# Patient Record
Sex: MALE | Race: WHITE | NOT HISPANIC OR LATINO | Employment: OTHER | URBAN - NONMETROPOLITAN AREA
[De-identification: names, ages, dates, MRNs, and addresses within clinical notes are randomized per-mention and may not be internally consistent; named-entity substitution may affect disease eponyms.]

---

## 2019-09-20 ENCOUNTER — TRANSCRIBE ORDERS (OUTPATIENT)
Dept: ADMINISTRATIVE | Facility: HOSPITAL | Age: 54
End: 2019-09-20

## 2019-09-20 ENCOUNTER — HOSPITAL ENCOUNTER (OUTPATIENT)
Dept: ULTRASOUND IMAGING | Facility: HOSPITAL | Age: 54
Discharge: HOME | End: 2019-09-20
Admitting: INTERNAL MEDICINE

## 2019-09-20 DIAGNOSIS — N13.9 ACUTE UNILATERAL OBSTRUCTIVE UROPATHY: ICD-10-CM

## 2019-09-20 DIAGNOSIS — N13.9 ACUTE UNILATERAL OBSTRUCTIVE UROPATHY: Primary | ICD-10-CM

## 2019-09-20 PROCEDURE — 76775 US EXAM ABDO BACK WALL LIM: CPT | Performed by: RADIOLOGY

## 2019-09-20 PROCEDURE — 76775 US EXAM ABDO BACK WALL LIM: CPT

## 2019-10-01 ENCOUNTER — OFFICE VISIT (OUTPATIENT)
Dept: UROLOGY | Facility: CLINIC | Age: 54
End: 2019-10-01

## 2019-10-01 VITALS
HEIGHT: 72 IN | BODY MASS INDEX: 26.9 KG/M2 | WEIGHT: 198.6 LBS | DIASTOLIC BLOOD PRESSURE: 80 MMHG | SYSTOLIC BLOOD PRESSURE: 138 MMHG

## 2019-10-01 DIAGNOSIS — R39.12 BENIGN PROSTATIC HYPERPLASIA WITH WEAK URINARY STREAM: ICD-10-CM

## 2019-10-01 DIAGNOSIS — R33.9 INCOMPLETE BLADDER EMPTYING: Primary | ICD-10-CM

## 2019-10-01 DIAGNOSIS — R35.0 FREQUENCY OF URINATION: ICD-10-CM

## 2019-10-01 DIAGNOSIS — N40.1 BENIGN PROSTATIC HYPERPLASIA WITH WEAK URINARY STREAM: ICD-10-CM

## 2019-10-01 LAB
BILIRUB BLD-MCNC: NEGATIVE MG/DL
CLARITY, POC: CLEAR
COLOR UR: YELLOW
GLUCOSE UR STRIP-MCNC: NEGATIVE MG/DL
KETONES UR QL: NEGATIVE
LEUKOCYTE EST, POC: NEGATIVE
NITRITE UR-MCNC: NEGATIVE MG/ML
PH UR: 6 [PH] (ref 5–8)
PROT UR STRIP-MCNC: NEGATIVE MG/DL
RBC # UR STRIP: NEGATIVE /UL
SP GR UR: 1.01 (ref 1–1.03)
UROBILINOGEN UR QL: NORMAL

## 2019-10-01 PROCEDURE — 51798 US URINE CAPACITY MEASURE: CPT | Performed by: UROLOGY

## 2019-10-01 PROCEDURE — 81003 URINALYSIS AUTO W/O SCOPE: CPT | Performed by: UROLOGY

## 2019-10-01 PROCEDURE — 99203 OFFICE O/P NEW LOW 30 MIN: CPT | Performed by: UROLOGY

## 2019-10-01 RX ORDER — CIPROFLOXACIN 500 MG/1
TABLET, FILM COATED ORAL
COMMUNITY
Start: 2019-09-27 | End: 2020-10-21

## 2019-10-01 RX ORDER — NIFEDIPINE 30 MG/1
30 TABLET, EXTENDED RELEASE ORAL 2 TIMES DAILY
COMMUNITY
Start: 2019-09-30

## 2019-10-01 RX ORDER — TAMSULOSIN HYDROCHLORIDE 0.4 MG/1
CAPSULE ORAL
COMMUNITY
Start: 2019-09-19 | End: 2020-10-21

## 2019-10-01 NOTE — PROGRESS NOTES
Chief Complaint:          Urgency and frequency    HPI:   54 y.o. male.  Pt had a week history of urgency and frequency.  Pt had some flank tenderness right worse than left.  He had a low grade temperature of 100 for one day.  His UA is negative today.  His symptoms are better but he has a sensation of pressure.  6 months ago he had a weak stream.  He had no other voiding symptoms.   HPI      Past Medical History:      History reviewed. No pertinent past medical history.      Current Meds:     Current Outpatient Medications   Medication Sig Dispense Refill   • ciprofloxacin (CIPRO) 500 MG tablet      • NIFEdipine XL (PROCARDIA XL) 30 MG 24 hr tablet      • tamsulosin (FLOMAX) 0.4 MG capsule 24 hr capsule        No current facility-administered medications for this visit.         Allergies:      Allergies   Allergen Reactions   • Benicar [Olmesartan] GI Intolerance        Past Surgical History:     Past Surgical History:   Procedure Laterality Date   • TONSILLECTOMY           Social History:     Social History     Socioeconomic History   • Marital status:      Spouse name: Not on file   • Number of children: Not on file   • Years of education: Not on file   • Highest education level: Not on file   Tobacco Use   • Smoking status: Never Smoker   • Smokeless tobacco: Never Used   Substance and Sexual Activity   • Alcohol use: Yes     Comment: Rarely   • Drug use: No   • Sexual activity: Not Currently       Family History:     Family History   Problem Relation Age of Onset   • Hypertension Mother        Review of Systems:     Review of Systems   Constitutional: Negative for activity change, appetite change, chills, diaphoresis and fatigue.   HENT: Negative for congestion, dental problem, drooling, ear discharge, ear pain, facial swelling and hearing loss.    Eyes: Negative for pain, discharge and itching.   Respiratory: Negative for apnea, cough, choking and chest tightness.    Cardiovascular: Negative for chest  pain, palpitations and leg swelling.   Gastrointestinal: Negative for abdominal distention, abdominal pain, anal bleeding, blood in stool and constipation.   Endocrine: Negative for cold intolerance, heat intolerance and polydipsia.   Genitourinary: Positive for frequency and urgency. Negative for difficulty urinating, discharge, dysuria, enuresis, flank pain, genital sores and hematuria.   Musculoskeletal: Negative for arthralgias, back pain, gait problem, joint swelling and myalgias.   Skin: Negative for color change and pallor.   Allergic/Immunologic: Negative for environmental allergies and food allergies.   Neurological: Negative for facial asymmetry, light-headedness, numbness and headaches.   Psychiatric/Behavioral: Negative for agitation, behavioral problems, confusion, decreased concentration, dysphoric mood and hallucinations. The patient is not hyperactive.        IPSS Questionnaire (AUA-7):  Over the past month…    1)  Incomplete Emptying  How often have you had a sensation of not emptying your bladder?  3 - About half the time   2)  Frequency  How often have you had to urinate less than every two hours? 2 - Less than half the time   3)  Intermittency  How often have you found you stopped and started again several times when you urinated?  3 - About half the time   4) Urgency  How often have you found it difficult to postpone urination?  0 - Not at all   5) Weak Stream  How often have you had a weak urinary stream?  5 - Almost always   6) Straining  How often have you had to push or strain to begin urination?  1 - Less than 1 time in 5   7) Nocturia  How many times did you typically get up at night to urinate?  2 - 2 times   Total Score:  16       Quality of life due to urinary symptoms:  If you were to spend the rest of your life with your urinary condition the way it is now, how would you feel about that? 5-Unhappy   Urine Leakage (Incontinence) 0-No Leakage       I have reviewed the follow portions of  "the patient's history and confirmed they are accurate today:  allergies, current medications, past family history, past medical history, past social history, past surgical history, problem list and ROS  Physical Exam:     Physical Exam   Constitutional: He is oriented to person, place, and time.   HENT:   Head: Normocephalic and atraumatic.   Right Ear: External ear normal.   Left Ear: External ear normal.   Nose: Nose normal.   Mouth/Throat: Oropharynx is clear and moist.   Eyes: Conjunctivae and EOM are normal. Pupils are equal, round, and reactive to light.   Neck: Normal range of motion. Neck supple. No thyromegaly present.   Cardiovascular: Normal rate, regular rhythm, normal heart sounds and intact distal pulses.   No murmur heard.  Pulmonary/Chest: Effort normal and breath sounds normal. No respiratory distress. He has no wheezes. He has no rales. He exhibits no tenderness.   Abdominal: Soft. Bowel sounds are normal. He exhibits no distension and no mass. There is no tenderness. No hernia.   Genitourinary: Rectum normal, prostate normal and penis normal.   Genitourinary Comments: No nodules   Musculoskeletal: Normal range of motion. He exhibits no edema or tenderness.   Lymphadenopathy:     He has no cervical adenopathy.   Neurological: He is alert and oriented to person, place, and time. No cranial nerve deficit. He exhibits normal muscle tone. Coordination normal.   Skin: Skin is warm. No rash noted.   Psychiatric: He has a normal mood and affect. His behavior is normal. Judgment and thought content normal.   Nursing note and vitals reviewed.      /80   Ht 182.9 cm (72\")   Wt 90.1 kg (198 lb 9.6 oz)   BMI 26.94 kg/m²    Procedure:         Assessment:     Encounter Diagnoses   Name Primary?   • Incomplete bladder emptying Yes   • Frequency of urination        Orders Placed This Encounter   Procedures   • Bladder Scan   • POC Urinalysis Dipstick, Automated       Patient reports that he is not " currently experiencing any symptoms of urinary incontinence.      Plan:   Will see pt back in a year with psa    Patient's Body mass index is 26.94 kg/m². BMI is within normal parameters. No follow-up required..      Smoking Cessation Counseling:  Never a smoker.  Patient does not currently use any tobacco products.       Counseling was given to patient for the following topics impressions as follows: prostatitis. The interim medical history and current results were reviewed.  A treatment plan with follow-up was made for Incomplete bladder emptying [R33.9].     This document has been electronically signed by Hugo Dhaliwal MD October 1, 2019 11:49 AM

## 2020-09-03 ENCOUNTER — LAB (OUTPATIENT)
Dept: LAB | Facility: HOSPITAL | Age: 55
End: 2020-09-03

## 2020-09-03 ENCOUNTER — TRANSCRIBE ORDERS (OUTPATIENT)
Dept: ADMINISTRATIVE | Facility: HOSPITAL | Age: 55
End: 2020-09-03

## 2020-09-03 DIAGNOSIS — Z20.828 EXPOSURE TO SARS-ASSOCIATED CORONAVIRUS: ICD-10-CM

## 2020-09-03 DIAGNOSIS — Z20.828 EXPOSURE TO SARS-ASSOCIATED CORONAVIRUS: Primary | ICD-10-CM

## 2020-09-03 PROCEDURE — U0004 COV-19 TEST NON-CDC HGH THRU: HCPCS

## 2020-09-03 PROCEDURE — C9803 HOPD COVID-19 SPEC COLLECT: HCPCS

## 2020-09-04 LAB — SARS-COV-2 RNA NOSE QL NAA+PROBE: NOT DETECTED

## 2020-09-21 ENCOUNTER — TRANSCRIBE ORDERS (OUTPATIENT)
Dept: ADMINISTRATIVE | Facility: HOSPITAL | Age: 55
End: 2020-09-21

## 2020-09-21 DIAGNOSIS — N45.1 EPIDIDYMITIS WITHOUT ABSCESS: Primary | ICD-10-CM

## 2020-09-23 ENCOUNTER — HOSPITAL ENCOUNTER (OUTPATIENT)
Dept: ULTRASOUND IMAGING | Facility: HOSPITAL | Age: 55
Discharge: HOME | End: 2020-09-23
Admitting: INTERNAL MEDICINE

## 2020-09-23 ENCOUNTER — HOSPITAL ENCOUNTER (OUTPATIENT)
Dept: ULTRASOUND IMAGING | Facility: HOSPITAL | Age: 55
End: 2020-09-23

## 2020-09-23 DIAGNOSIS — N45.1 EPIDIDYMITIS WITHOUT ABSCESS: ICD-10-CM

## 2020-09-23 PROCEDURE — 76870 US EXAM SCROTUM: CPT | Performed by: RADIOLOGY

## 2020-09-23 PROCEDURE — 76870 US EXAM SCROTUM: CPT

## 2020-10-06 ENCOUNTER — OFFICE VISIT (OUTPATIENT)
Dept: UROLOGY | Facility: CLINIC | Age: 55
End: 2020-10-06

## 2020-10-06 VITALS — TEMPERATURE: 98.5 F | HEIGHT: 72 IN | BODY MASS INDEX: 25.73 KG/M2 | WEIGHT: 190 LBS

## 2020-10-06 DIAGNOSIS — N40.1 BENIGN PROSTATIC HYPERPLASIA WITH WEAK URINARY STREAM: Primary | ICD-10-CM

## 2020-10-06 DIAGNOSIS — K40.90 RIGHT INGUINAL HERNIA: ICD-10-CM

## 2020-10-06 DIAGNOSIS — R39.12 BENIGN PROSTATIC HYPERPLASIA WITH WEAK URINARY STREAM: Primary | ICD-10-CM

## 2020-10-06 PROCEDURE — 99214 OFFICE O/P EST MOD 30 MIN: CPT | Performed by: UROLOGY

## 2020-10-06 RX ORDER — TADALAFIL 20 MG/1
40 TABLET ORAL
COMMUNITY
End: 2020-10-21

## 2020-10-06 RX ORDER — TADALAFIL 5 MG/1
5 TABLET ORAL DAILY PRN
Qty: 30 TABLET | Refills: 6 | Status: SHIPPED | OUTPATIENT
Start: 2020-10-06

## 2020-10-06 NOTE — PROGRESS NOTES
Chief Complaint:          Chief Complaint   Patient presents with   • Urinary Urgency       HPI:   55 y.o. male is here for evaluation.  He saw Dr. Dhaliwal last year and wants a second opinion.  He has a history of suprapubic pain dysuria right groin pain no trauma no infection he does a lot of heavy lifting as he is building his own house.  He gets up twice at night no dysuria he does have intermittency his erections and ejaculations are fine occasional rectal pain worse with sitting.  Occasional sciatica on the left he is retired .  He has mild hypertension.  He has had a tonsillectomy.  Examination shows a right inguinal hernia.  He is motivated enough to have a repair not going to make a referral other than that his examination is unremarkable.      Past Medical History:        Past Medical History:   Diagnosis Date   • Hypertension          Current Meds:     Current Outpatient Medications   Medication Sig Dispense Refill   • NIFEdipine XL (PROCARDIA XL) 30 MG 24 hr tablet      • tadalafil (ADCIRCA) 20 MG tablet tablet Take 40 mg by mouth Daily.     • ciprofloxacin (CIPRO) 500 MG tablet      • tamsulosin (FLOMAX) 0.4 MG capsule 24 hr capsule        No current facility-administered medications for this visit.         Allergies:      Allergies   Allergen Reactions   • Benicar [Olmesartan] GI Intolerance        Past Surgical History:     Past Surgical History:   Procedure Laterality Date   • TONSILLECTOMY           Social History:     Social History     Socioeconomic History   • Marital status:      Spouse name: Not on file   • Number of children: Not on file   • Years of education: Not on file   • Highest education level: Not on file   Tobacco Use   • Smoking status: Never Smoker   • Smokeless tobacco: Never Used   Substance and Sexual Activity   • Alcohol use: Never     Frequency: Never     Comment: Rarely   • Drug use: No   • Sexual activity: Not Currently       Family History:          Family History   Problem Relation Age of Onset   • Hypertension Mother    • Diabetes Father    • Hypertension Father        Review of Systems:     Review of Systems   Constitutional: Negative.  Negative for chills, fatigue and fever.   HENT: Negative.    Eyes: Negative.    Respiratory: Negative.  Negative for cough, shortness of breath and wheezing.    Cardiovascular: Negative.  Negative for leg swelling.   Gastrointestinal: Negative.  Negative for abdominal pain, nausea and vomiting.   Endocrine: Negative.    Genitourinary: Positive for urgency.   Musculoskeletal: Negative.  Negative for back pain and joint swelling.   Allergic/Immunologic: Negative.    Neurological: Negative.  Negative for dizziness and headaches.   Hematological: Negative.    Psychiatric/Behavioral: Negative.  Negative for confusion.       Physical Exam:     Physical Exam  Vitals signs and nursing note reviewed.   Constitutional:       Appearance: He is well-developed.   HENT:      Head: Normocephalic and atraumatic.   Eyes:      Conjunctiva/sclera: Conjunctivae normal.      Pupils: Pupils are equal, round, and reactive to light.   Neck:      Musculoskeletal: Normal range of motion.   Cardiovascular:      Rate and Rhythm: Normal rate and regular rhythm.      Heart sounds: Normal heart sounds.   Pulmonary:      Effort: Pulmonary effort is normal.      Breath sounds: Normal breath sounds.   Abdominal:      General: Bowel sounds are normal.      Palpations: Abdomen is soft.   Genitourinary:     Comments: Soft nontender abdomen with no organomegaly, rigidity, or tenderness.  He has normal external genitalia and uncircumcised phallus with a freely movable foreskin bilaterally descended testes without masses there is a right inguinal hernias adenopathy or abnormalities he had good rectal tone and a large smooth firm prostate.  There is no nodularity or any suspicious rectal abnormalities    Musculoskeletal: Normal range of motion.   Skin:      General: Skin is warm and dry.   Neurological:      Mental Status: He is alert and oriented to person, place, and time.      Deep Tendon Reflexes: Reflexes are normal and symmetric.   Psychiatric:         Behavior: Behavior normal.         Thought Content: Thought content normal.         Judgment: Judgment normal.         I have reviewed the following portions of the patient's history: allergies, current medications, past family history, past medical history, past social history, past surgical history, problem list and ROS and confirm it's accurate.      Procedure:       Assessment/Plan:   BPH: Discussed the pathophysiology of BPH and obstruction.  We discussed the static and dynamic effect effects of BPH as well as using 5 alpha reductase inhibitors versus alpha blockade.  We discussed the indications for transurethral surgery as well.  And/ or other therapeutic options available including all of the newer techniques.  He uses 2.5 mg of tadalafil daily and I recommended 5 mg and I gave him a new prescription  Right inguinal hernia-indirect we will make a referral he is interested in having it repaired            Patient's Body mass index is 25.77 kg/m². BMI is above normal parameters. Recommendations include: educational material.              This document has been electronically signed by ADA GARCIA MD October 6, 2020 09:55 EDT

## 2020-10-06 NOTE — PROGRESS NOTES
Chief Complaint:          No chief complaint on file.      HPI:   55 y.o. male.      Past Medical History:      No past medical history on file.      Current Meds:     Current Outpatient Medications   Medication Sig Dispense Refill   • ciprofloxacin (CIPRO) 500 MG tablet      • NIFEdipine XL (PROCARDIA XL) 30 MG 24 hr tablet      • tamsulosin (FLOMAX) 0.4 MG capsule 24 hr capsule        No current facility-administered medications for this visit.         Allergies:      Allergies   Allergen Reactions   • Benicar [Olmesartan] GI Intolerance        Past Surgical History:     Past Surgical History:   Procedure Laterality Date   • TONSILLECTOMY           Social History:     Social History     Socioeconomic History   • Marital status:      Spouse name: Not on file   • Number of children: Not on file   • Years of education: Not on file   • Highest education level: Not on file   Tobacco Use   • Smoking status: Never Smoker   • Smokeless tobacco: Never Used   Substance and Sexual Activity   • Alcohol use: Yes     Comment: Rarely   • Drug use: No   • Sexual activity: Not Currently       Family History:     Family History   Problem Relation Age of Onset   • Hypertension Mother        Review of Systems:     Review of Systems    Physical Exam:     Physical Exam    ***    Procedure:       Assessment/Plan:   ***       Patient reports that he is not currently experiencing any symptoms of urinary incontinence.      Patient's There is no height or weight on file to calculate BMI. BMI is {BMI range:05543}.              This document has been electronically signed by ADA GARCIA MD October 6, 2020 09:40 EDT

## 2020-10-07 PROBLEM — R39.12 BENIGN PROSTATIC HYPERPLASIA WITH WEAK URINARY STREAM: Status: ACTIVE | Noted: 2020-10-07

## 2020-10-07 PROBLEM — K40.90 RIGHT INGUINAL HERNIA: Status: ACTIVE | Noted: 2020-10-07

## 2020-10-07 PROBLEM — N40.1 BENIGN PROSTATIC HYPERPLASIA WITH WEAK URINARY STREAM: Status: ACTIVE | Noted: 2020-10-07

## 2020-10-21 ENCOUNTER — OFFICE VISIT (OUTPATIENT)
Dept: SURGERY | Facility: CLINIC | Age: 55
End: 2020-10-21

## 2020-10-21 VITALS
WEIGHT: 198 LBS | HEART RATE: 71 BPM | DIASTOLIC BLOOD PRESSURE: 86 MMHG | HEIGHT: 72 IN | SYSTOLIC BLOOD PRESSURE: 148 MMHG | BODY MASS INDEX: 26.82 KG/M2

## 2020-10-21 DIAGNOSIS — K40.20 NON-RECURRENT BILATERAL INGUINAL HERNIA WITHOUT OBSTRUCTION OR GANGRENE: Primary | ICD-10-CM

## 2020-10-21 PROCEDURE — 99243 OFF/OP CNSLTJ NEW/EST LOW 30: CPT | Performed by: SURGERY

## 2020-10-22 DIAGNOSIS — Z01.818 PREOP TESTING: Primary | ICD-10-CM

## 2020-10-22 PROBLEM — K40.20 NON-RECURRENT BILATERAL INGUINAL HERNIA WITHOUT OBSTRUCTION OR GANGRENE: Status: ACTIVE | Noted: 2020-10-22

## 2020-10-22 NOTE — PROGRESS NOTES
Subjective   Shyam Salas is a 55 y.o. male is being seen for consultation today at the request of Claudine Bonilla MD    Shyam Salas is a 55 y.o. male Presenting with occasionally painful right bulge in the groin consistent with inguinal hernia on examination.  Possible weakness of the ring with inguinal hernia on the contralateral side.  The patient also has a small reducible umbilical hernia.  No previous abdominal surgery.  No obstructive symptoms reported.      Past Medical History:   Diagnosis Date   • Hypertension        Family History   Problem Relation Age of Onset   • Hypertension Mother    • Diabetes Father    • Hypertension Father        Social History     Socioeconomic History   • Marital status:      Spouse name: Not on file   • Number of children: Not on file   • Years of education: Not on file   • Highest education level: Not on file   Tobacco Use   • Smoking status: Never Smoker   • Smokeless tobacco: Never Used   Substance and Sexual Activity   • Alcohol use: Never     Frequency: Never     Comment: Rarely   • Drug use: No   • Sexual activity: Not Currently       Past Surgical History:   Procedure Laterality Date   • TONSILLECTOMY         Review of Systems   Constitutional: Negative for activity change, appetite change, chills and fever.   HENT: Negative for sore throat and trouble swallowing.    Eyes: Negative for visual disturbance.   Respiratory: Negative for cough and shortness of breath.    Cardiovascular: Negative for chest pain and palpitations.   Gastrointestinal: Positive for abdominal pain. Negative for abdominal distention, blood in stool, constipation, diarrhea, nausea and vomiting.   Endocrine: Negative for cold intolerance and heat intolerance.   Genitourinary: Negative for dysuria.   Musculoskeletal: Negative for joint swelling.   Skin: Negative for color change, rash and wound.   Allergic/Immunologic: Negative for immunocompromised state.   Neurological: Negative for dizziness,  "seizures, weakness and headaches.   Hematological: Negative for adenopathy. Does not bruise/bleed easily.   Psychiatric/Behavioral: Negative for agitation and confusion.         /86   Pulse 71   Ht 182.9 cm (72.01\")   Wt 89.8 kg (198 lb)   BMI 26.85 kg/m²   Objective   Physical Exam  Constitutional:       Appearance: He is well-developed.   HENT:      Head: Normocephalic and atraumatic.   Eyes:      Conjunctiva/sclera: Conjunctivae normal.      Pupils: Pupils are equal, round, and reactive to light.   Neck:      Musculoskeletal: Neck supple.      Thyroid: No thyromegaly.      Vascular: No JVD.      Trachea: No tracheal deviation.   Cardiovascular:      Rate and Rhythm: Normal rate and regular rhythm.      Heart sounds: No murmur. No friction rub. No gallop.    Pulmonary:      Effort: Pulmonary effort is normal.      Breath sounds: Normal breath sounds.   Abdominal:      General: There is no distension.      Palpations: Abdomen is soft. There is no hepatomegaly or splenomegaly.      Tenderness: There is no abdominal tenderness.      Hernia: A hernia is present. Hernia is present in the umbilical area and right inguinal area.      Comments: Weakness of the left inguinal ring and possible hernia   Musculoskeletal: Normal range of motion.         General: No deformity.   Skin:     General: Skin is warm and dry.   Neurological:      Mental Status: He is alert and oriented to person, place, and time.               Assessment   Diagnoses and all orders for this visit:    1. Non-recurrent bilateral inguinal hernia without obstruction or gangrene (Primary)  -     Case Request; Standing  -     ceFAZolin (ANCEF) 1 g/100 mL 0.9% NS IVPB (mbp)  -     Case Request    Other orders  -     Follow anesthesia standing orders.  -     Provide NPO Instructions to Patient; Future  -     Chlorhexidine Skin Prep; Future  -     Follow Anesthesia Guidelines / Protocol; Standing  -     Verify NPO Status; Standing  -     Obtain " informed consent; Standing  -     SCD (sequential compression device)- to be placed on patient in Pre-op; Standing  -     Verify / Perform Chlorhexidine Skin Prep; Standing  -     Verify / Perform Chlorhexidine Skin Prep if Indicated (If Not Already Completed); Standing  -     Instructions on coughing, deep breathing, and incentive spirometry.; Standing      Shyam Salas is a 55 y.o. male with small reducible umbilical and symptomatic right inguinal hernia possible left inguinal hernia defect.  He understands risk and benefits of surgery will undergo robotic repair of umbilical and right possible bilateral inguinal hernia.    Patient's Body mass index is 26.85 kg/m². BMI is above normal parameters. Recommendations include: educational material.

## 2020-12-01 NOTE — DISCHARGE INSTRUCTIONS
TAKE the following medications the morning of surgery:    All heart or blood pressure medications    Please discontinue all blood thinners and anticoagulants (except aspirin) prior to surgery as per your surgeon and cardiologist instructions.  Aspirin may be continued up to the day prior to surgery.    HOLD all diabetic medications the morning of surgery as order by physician.    Please follow instructions on use of prep cloths provided by nurse. Return instruction sheet to pre-op nurse on day of surgery.    Arrival time for surgery on 12/4/20 will be given to you by Dr. Diana's office.    A RESPONSIBLE PERSON MUST REMAIN IN THE WAITING ROOM DURING YOUR PROCEDURE AND A RESPONSIBLE  MUST BE AVAILABLE UPON YOUR DISCHARGE.    General Instructions:  • Do NOT eat or drink after midnight 12/3/20 which includes water, mints, or gum.  • You may brush your teeth. Dental appliances that are removable must be taken out day of surgery.  • Do NOT smoke, chew tobacco, or drink alcohol within 24 hours prior to surgery.  • Bring medications in original bottles, any inhalers and if applicable your C-PAP/BI-PAP machine  • Bring any papers given to you in the doctor’s office  • Wear clean, comfortable clothes and socks  • Do NOT wear contact lenses or make-up or dark nail polish.  Bring a case for your glasses if applicable.  • Bring crutches or walker if applicable  • Leave all other valuables and jewelry at home  • If you were given a blood bank armband, continue to wear it until discharged.    Preventing a Surgical Site Infection:  • Shower the night before surgery (unless instructed otherwise) using a fresh bar of anti-bacterial soap (such as Dial) and clean washcloth.  Dry with a clean towel and dress in clean clothing.  • For 2 to 3 days before surgery, avoid shaving with a razor near where you will have surgery because the razor can irritate skin and make it easier to develop an infection.  Ask your surgeon if you will be  receiving antibiotics prior to surgery.  • Make sure you, your family, and all healthcare providers clean their hands with soap and water or an alcohol-based hand  before caring for you or your wound.  • If at all possible, quit smoking as many days before surgery as you can.    Day of Surgery:  Upon arrival, a pre-op nurse and anesthesiologist will review your health history, obtain vital signs, and answer questions you may have.  The only belongings needed at this time will be your home medications and if applicable you C-PAP/BI-PAP machine.  If you are staying overnight, your family can leave the rest of your belongings in the car and bring them to your room later.  A pre-op nurse will start an IV and you may receive medication in preparation for surgery.  Due to patient privacy and limited space, only one member of your family will be able to accompany you in the pre-op area.  While you are in surgery your family should notify the waiting room  if they leave the waiting room area and provide a contact number.  Please be aware that surgery does come with discomfort.  We want to make every effort to control your discomfort so please discuss any uncontrolled symptoms with your nurse.  Your doctor will most likely have prescribed pain medications.  If you are going home after surgery you will receive individualized written care instructions before being discharged.  A responsible adult must drive you to and from the hospital on the day of surgery and stay with you for 24 hours.  If you are staying overnight following surgery, you will be transported to your hospital room following the recovery period.

## 2020-12-02 ENCOUNTER — LAB (OUTPATIENT)
Dept: LAB | Facility: HOSPITAL | Age: 55
End: 2020-12-02

## 2020-12-02 ENCOUNTER — APPOINTMENT (OUTPATIENT)
Dept: PREADMISSION TESTING | Facility: HOSPITAL | Age: 55
End: 2020-12-02

## 2020-12-02 DIAGNOSIS — Z01.818 PREOP TESTING: ICD-10-CM

## 2020-12-02 LAB
ANION GAP SERPL CALCULATED.3IONS-SCNC: 10.3 MMOL/L (ref 5–15)
BUN SERPL-MCNC: 19 MG/DL (ref 6–20)
BUN/CREAT SERPL: 16.8 (ref 7–25)
CALCIUM SPEC-SCNC: 9.6 MG/DL (ref 8.6–10.5)
CHLORIDE SERPL-SCNC: 103 MMOL/L (ref 98–107)
CO2 SERPL-SCNC: 27.7 MMOL/L (ref 22–29)
CREAT SERPL-MCNC: 1.13 MG/DL (ref 0.76–1.27)
DEPRECATED RDW RBC AUTO: 46.1 FL (ref 37–54)
ERYTHROCYTE [DISTWIDTH] IN BLOOD BY AUTOMATED COUNT: 13.8 % (ref 12.3–15.4)
GFR SERPL CREATININE-BSD FRML MDRD: 67 ML/MIN/1.73
GLUCOSE SERPL-MCNC: 97 MG/DL (ref 65–99)
HCT VFR BLD AUTO: 49.5 % (ref 37.5–51)
HGB BLD-MCNC: 16.2 G/DL (ref 13–17.7)
MCH RBC QN AUTO: 29.6 PG (ref 26.6–33)
MCHC RBC AUTO-ENTMCNC: 32.7 G/DL (ref 31.5–35.7)
MCV RBC AUTO: 90.3 FL (ref 79–97)
PLATELET # BLD AUTO: 240 10*3/MM3 (ref 140–450)
PMV BLD AUTO: 11.4 FL (ref 6–12)
POTASSIUM SERPL-SCNC: 4 MMOL/L (ref 3.5–5.2)
RBC # BLD AUTO: 5.48 10*6/MM3 (ref 4.14–5.8)
SARS-COV-2 RNA RESP QL NAA+PROBE: NOT DETECTED
SODIUM SERPL-SCNC: 141 MMOL/L (ref 136–145)
WBC # BLD AUTO: 8.58 10*3/MM3 (ref 3.4–10.8)

## 2020-12-02 PROCEDURE — U0004 COV-19 TEST NON-CDC HGH THRU: HCPCS | Performed by: SURGERY

## 2020-12-02 PROCEDURE — 36415 COLL VENOUS BLD VENIPUNCTURE: CPT

## 2020-12-02 PROCEDURE — C9803 HOPD COVID-19 SPEC COLLECT: HCPCS

## 2020-12-02 PROCEDURE — 80048 BASIC METABOLIC PNL TOTAL CA: CPT

## 2020-12-02 PROCEDURE — 85027 COMPLETE CBC AUTOMATED: CPT

## 2020-12-02 RX ORDER — PRAVASTATIN SODIUM 20 MG
20 TABLET ORAL DAILY
COMMUNITY
End: 2020-12-22 | Stop reason: DRUGHIGH

## 2020-12-04 ENCOUNTER — HOSPITAL ENCOUNTER (OUTPATIENT)
Facility: HOSPITAL | Age: 55
Setting detail: HOSPITAL OUTPATIENT SURGERY
Discharge: HOME | End: 2020-12-04
Attending: SURGERY | Admitting: SURGERY

## 2020-12-04 ENCOUNTER — ANESTHESIA EVENT (OUTPATIENT)
Dept: PERIOP | Facility: HOSPITAL | Age: 55
End: 2020-12-04

## 2020-12-04 ENCOUNTER — ANESTHESIA (OUTPATIENT)
Dept: PERIOP | Facility: HOSPITAL | Age: 55
End: 2020-12-04

## 2020-12-04 VITALS
SYSTOLIC BLOOD PRESSURE: 145 MMHG | RESPIRATION RATE: 18 BRPM | HEIGHT: 72 IN | TEMPERATURE: 97.8 F | WEIGHT: 197.6 LBS | BODY MASS INDEX: 26.76 KG/M2 | HEART RATE: 80 BPM | DIASTOLIC BLOOD PRESSURE: 83 MMHG | OXYGEN SATURATION: 98 %

## 2020-12-04 DIAGNOSIS — K40.20 NON-RECURRENT BILATERAL INGUINAL HERNIA WITHOUT OBSTRUCTION OR GANGRENE: ICD-10-CM

## 2020-12-04 PROCEDURE — C1781 MESH (IMPLANTABLE): HCPCS | Performed by: SURGERY

## 2020-12-04 PROCEDURE — 25010000002 PROPOFOL 10 MG/ML EMULSION: Performed by: NURSE ANESTHETIST, CERTIFIED REGISTERED

## 2020-12-04 PROCEDURE — 49650 LAP ING HERNIA REPAIR INIT: CPT | Performed by: SURGERY

## 2020-12-04 PROCEDURE — S0260 H&P FOR SURGERY: HCPCS | Performed by: SURGERY

## 2020-12-04 PROCEDURE — C1729 CATH, DRAINAGE: HCPCS | Performed by: SURGERY

## 2020-12-04 PROCEDURE — 25010000002 MIDAZOLAM PER 1 MG: Performed by: NURSE ANESTHETIST, CERTIFIED REGISTERED

## 2020-12-04 PROCEDURE — 25010000002 NEOSTIGMINE 10 MG/10ML SOLUTION: Performed by: NURSE ANESTHETIST, CERTIFIED REGISTERED

## 2020-12-04 PROCEDURE — 25010000002 ROPIVACAINE PER 1 MG: Performed by: NURSE ANESTHETIST, CERTIFIED REGISTERED

## 2020-12-04 PROCEDURE — 25010000002 FENTANYL CITRATE (PF) 100 MCG/2ML SOLUTION: Performed by: NURSE ANESTHETIST, CERTIFIED REGISTERED

## 2020-12-04 PROCEDURE — 94799 UNLISTED PULMONARY SVC/PX: CPT

## 2020-12-04 PROCEDURE — 25010000002 ONDANSETRON PER 1 MG: Performed by: NURSE ANESTHETIST, CERTIFIED REGISTERED

## 2020-12-04 PROCEDURE — 25010000002 BUPRENORPHINE PER 0.1 MG: Performed by: NURSE ANESTHETIST, CERTIFIED REGISTERED

## 2020-12-04 PROCEDURE — 25010000002 DEXAMETHASONE PER 1 MG: Performed by: NURSE ANESTHETIST, CERTIFIED REGISTERED

## 2020-12-04 DEVICE — BARD 3DMAX MESH LEFT LARGE
Type: IMPLANTABLE DEVICE | Site: INGUINAL | Status: FUNCTIONAL
Brand: BARD 3DMAX MESH

## 2020-12-04 DEVICE — ABSORBABLE WOUND CLOSURE DEVICE
Type: IMPLANTABLE DEVICE | Site: INGUINAL | Status: FUNCTIONAL
Brand: V-LOC 90

## 2020-12-04 DEVICE — BARD 3DMAX MESH LEFT EXTRA LARGE
Type: IMPLANTABLE DEVICE | Site: INGUINAL | Status: FUNCTIONAL
Brand: BARD 3DMAX MESH

## 2020-12-04 RX ORDER — ROPIVACAINE HYDROCHLORIDE 5 MG/ML
INJECTION, SOLUTION EPIDURAL; INFILTRATION; PERINEURAL
Status: COMPLETED | OUTPATIENT
Start: 2020-12-04 | End: 2020-12-04

## 2020-12-04 RX ORDER — SODIUM CHLORIDE 0.9 % (FLUSH) 0.9 %
10 SYRINGE (ML) INJECTION AS NEEDED
Status: DISCONTINUED | OUTPATIENT
Start: 2020-12-04 | End: 2020-12-04 | Stop reason: HOSPADM

## 2020-12-04 RX ORDER — BUPRENORPHINE HYDROCHLORIDE 0.32 MG/ML
INJECTION INTRAMUSCULAR; INTRAVENOUS
Status: COMPLETED | OUTPATIENT
Start: 2020-12-04 | End: 2020-12-04

## 2020-12-04 RX ORDER — ONDANSETRON 2 MG/ML
4 INJECTION INTRAMUSCULAR; INTRAVENOUS AS NEEDED
Status: DISCONTINUED | OUTPATIENT
Start: 2020-12-04 | End: 2020-12-04 | Stop reason: HOSPADM

## 2020-12-04 RX ORDER — DEXAMETHASONE SODIUM PHOSPHATE 4 MG/ML
INJECTION, SOLUTION INTRA-ARTICULAR; INTRALESIONAL; INTRAMUSCULAR; INTRAVENOUS; SOFT TISSUE
Status: COMPLETED | OUTPATIENT
Start: 2020-12-04 | End: 2020-12-04

## 2020-12-04 RX ORDER — NEOSTIGMINE METHYLSULFATE 1 MG/ML
INJECTION, SOLUTION INTRAVENOUS AS NEEDED
Status: DISCONTINUED | OUTPATIENT
Start: 2020-12-04 | End: 2020-12-04 | Stop reason: SURG

## 2020-12-04 RX ORDER — MEPERIDINE HYDROCHLORIDE 25 MG/ML
12.5 INJECTION INTRAMUSCULAR; INTRAVENOUS; SUBCUTANEOUS
Status: DISCONTINUED | OUTPATIENT
Start: 2020-12-04 | End: 2020-12-04 | Stop reason: HOSPADM

## 2020-12-04 RX ORDER — IPRATROPIUM BROMIDE AND ALBUTEROL SULFATE 2.5; .5 MG/3ML; MG/3ML
3 SOLUTION RESPIRATORY (INHALATION) ONCE AS NEEDED
Status: DISCONTINUED | OUTPATIENT
Start: 2020-12-04 | End: 2020-12-04 | Stop reason: HOSPADM

## 2020-12-04 RX ORDER — MIDAZOLAM HYDROCHLORIDE 1 MG/ML
INJECTION INTRAMUSCULAR; INTRAVENOUS AS NEEDED
Status: DISCONTINUED | OUTPATIENT
Start: 2020-12-04 | End: 2020-12-04 | Stop reason: SURG

## 2020-12-04 RX ORDER — FENTANYL CITRATE 50 UG/ML
50 INJECTION, SOLUTION INTRAMUSCULAR; INTRAVENOUS
Status: DISCONTINUED | OUTPATIENT
Start: 2020-12-04 | End: 2020-12-04 | Stop reason: HOSPADM

## 2020-12-04 RX ORDER — MIDAZOLAM HYDROCHLORIDE 1 MG/ML
1 INJECTION INTRAMUSCULAR; INTRAVENOUS
Status: DISCONTINUED | OUTPATIENT
Start: 2020-12-04 | End: 2020-12-04 | Stop reason: HOSPADM

## 2020-12-04 RX ORDER — SODIUM CHLORIDE, SODIUM LACTATE, POTASSIUM CHLORIDE, CALCIUM CHLORIDE 600; 310; 30; 20 MG/100ML; MG/100ML; MG/100ML; MG/100ML
INJECTION, SOLUTION INTRAVENOUS CONTINUOUS PRN
Status: DISCONTINUED | OUTPATIENT
Start: 2020-12-04 | End: 2020-12-04 | Stop reason: SURG

## 2020-12-04 RX ORDER — SODIUM CHLORIDE, SODIUM LACTATE, POTASSIUM CHLORIDE, CALCIUM CHLORIDE 600; 310; 30; 20 MG/100ML; MG/100ML; MG/100ML; MG/100ML
100 INJECTION, SOLUTION INTRAVENOUS ONCE AS NEEDED
Status: DISCONTINUED | OUTPATIENT
Start: 2020-12-04 | End: 2020-12-04 | Stop reason: HOSPADM

## 2020-12-04 RX ORDER — KETOROLAC TROMETHAMINE 30 MG/ML
30 INJECTION, SOLUTION INTRAMUSCULAR; INTRAVENOUS EVERY 6 HOURS PRN
Status: DISCONTINUED | OUTPATIENT
Start: 2020-12-04 | End: 2020-12-04 | Stop reason: HOSPADM

## 2020-12-04 RX ORDER — DROPERIDOL 2.5 MG/ML
0.62 INJECTION, SOLUTION INTRAMUSCULAR; INTRAVENOUS ONCE AS NEEDED
Status: DISCONTINUED | OUTPATIENT
Start: 2020-12-04 | End: 2020-12-04 | Stop reason: HOSPADM

## 2020-12-04 RX ORDER — IBUPROFEN 600 MG/1
600 TABLET ORAL EVERY 6 HOURS PRN
Qty: 30 TABLET | Refills: 0 | Status: SHIPPED | OUTPATIENT
Start: 2020-12-04

## 2020-12-04 RX ORDER — OXYCODONE HYDROCHLORIDE AND ACETAMINOPHEN 5; 325 MG/1; MG/1
1 TABLET ORAL ONCE AS NEEDED
Status: COMPLETED | OUTPATIENT
Start: 2020-12-04 | End: 2020-12-04

## 2020-12-04 RX ORDER — LIDOCAINE HYDROCHLORIDE 20 MG/ML
INJECTION, SOLUTION INFILTRATION; PERINEURAL AS NEEDED
Status: DISCONTINUED | OUTPATIENT
Start: 2020-12-04 | End: 2020-12-04 | Stop reason: SURG

## 2020-12-04 RX ORDER — FAMOTIDINE 10 MG/ML
INJECTION, SOLUTION INTRAVENOUS AS NEEDED
Status: DISCONTINUED | OUTPATIENT
Start: 2020-12-04 | End: 2020-12-04 | Stop reason: SURG

## 2020-12-04 RX ORDER — TRAMADOL HYDROCHLORIDE 50 MG/1
50 TABLET ORAL EVERY 6 HOURS PRN
Qty: 8 TABLET | Refills: 0 | Status: SHIPPED | OUTPATIENT
Start: 2020-12-04

## 2020-12-04 RX ORDER — SODIUM CHLORIDE 0.9 % (FLUSH) 0.9 %
10 SYRINGE (ML) INJECTION EVERY 12 HOURS SCHEDULED
Status: DISCONTINUED | OUTPATIENT
Start: 2020-12-04 | End: 2020-12-04 | Stop reason: HOSPADM

## 2020-12-04 RX ORDER — MAGNESIUM HYDROXIDE 1200 MG/15ML
LIQUID ORAL AS NEEDED
Status: DISCONTINUED | OUTPATIENT
Start: 2020-12-04 | End: 2020-12-04 | Stop reason: HOSPADM

## 2020-12-04 RX ORDER — ONDANSETRON 2 MG/ML
INJECTION INTRAMUSCULAR; INTRAVENOUS AS NEEDED
Status: DISCONTINUED | OUTPATIENT
Start: 2020-12-04 | End: 2020-12-04 | Stop reason: SURG

## 2020-12-04 RX ORDER — ROCURONIUM BROMIDE 10 MG/ML
INJECTION, SOLUTION INTRAVENOUS AS NEEDED
Status: DISCONTINUED | OUTPATIENT
Start: 2020-12-04 | End: 2020-12-04 | Stop reason: SURG

## 2020-12-04 RX ORDER — GLYCOPYRROLATE 0.2 MG/ML
INJECTION INTRAMUSCULAR; INTRAVENOUS AS NEEDED
Status: DISCONTINUED | OUTPATIENT
Start: 2020-12-04 | End: 2020-12-04 | Stop reason: SURG

## 2020-12-04 RX ORDER — PROPOFOL 10 MG/ML
VIAL (ML) INTRAVENOUS AS NEEDED
Status: DISCONTINUED | OUTPATIENT
Start: 2020-12-04 | End: 2020-12-04 | Stop reason: SURG

## 2020-12-04 RX ORDER — MINERAL OIL 471.99 G/472ML
OIL TOPICAL AS NEEDED
Status: DISCONTINUED | OUTPATIENT
Start: 2020-12-04 | End: 2020-12-04 | Stop reason: HOSPADM

## 2020-12-04 RX ORDER — FENTANYL CITRATE 50 UG/ML
INJECTION, SOLUTION INTRAMUSCULAR; INTRAVENOUS AS NEEDED
Status: DISCONTINUED | OUTPATIENT
Start: 2020-12-04 | End: 2020-12-04 | Stop reason: SURG

## 2020-12-04 RX ORDER — SODIUM CHLORIDE, SODIUM LACTATE, POTASSIUM CHLORIDE, CALCIUM CHLORIDE 600; 310; 30; 20 MG/100ML; MG/100ML; MG/100ML; MG/100ML
125 INJECTION, SOLUTION INTRAVENOUS ONCE
Status: COMPLETED | OUTPATIENT
Start: 2020-12-04 | End: 2020-12-04

## 2020-12-04 RX ORDER — ACETAMINOPHEN 325 MG/1
650 TABLET ORAL EVERY 4 HOURS PRN
Qty: 30 TABLET | Refills: 0 | Status: SHIPPED | OUTPATIENT
Start: 2020-12-04

## 2020-12-04 RX ADMIN — MIDAZOLAM HYDROCHLORIDE 2 MG: 1 INJECTION, SOLUTION INTRAMUSCULAR; INTRAVENOUS at 07:30

## 2020-12-04 RX ADMIN — NEOSTIGMINE METHYLSULFATE 4 MG: 1 INJECTION, SOLUTION INTRAVENOUS at 08:57

## 2020-12-04 RX ADMIN — SODIUM CHLORIDE, POTASSIUM CHLORIDE, SODIUM LACTATE AND CALCIUM CHLORIDE: 600; 310; 30; 20 INJECTION, SOLUTION INTRAVENOUS at 07:30

## 2020-12-04 RX ADMIN — BUPRENORPHINE HYDROCHLORIDE 0.3 MG: 0.32 INJECTION INTRAMUSCULAR; INTRAVENOUS at 07:52

## 2020-12-04 RX ADMIN — DEXAMETHASONE SODIUM PHOSPHATE 8 MG: 4 INJECTION, SOLUTION INTRAMUSCULAR; INTRAVENOUS at 07:52

## 2020-12-04 RX ADMIN — GLYCOPYRROLATE 0.8 MG: 0.2 INJECTION, SOLUTION INTRAMUSCULAR; INTRAVENOUS at 08:57

## 2020-12-04 RX ADMIN — ROCURONIUM BROMIDE 35 MG: 10 SOLUTION INTRAVENOUS at 07:35

## 2020-12-04 RX ADMIN — ROPIVACAINE HYDROCHLORIDE 250 MG: 5 INJECTION, SOLUTION EPIDURAL; INFILTRATION; PERINEURAL at 07:52

## 2020-12-04 RX ADMIN — FENTANYL CITRATE 100 MCG: 50 INJECTION INTRAMUSCULAR; INTRAVENOUS at 07:30

## 2020-12-04 RX ADMIN — ONDANSETRON 4 MG: 2 INJECTION INTRAMUSCULAR; INTRAVENOUS at 07:35

## 2020-12-04 RX ADMIN — FAMOTIDINE 20 MG: 10 INJECTION INTRAVENOUS at 07:30

## 2020-12-04 RX ADMIN — OXYCODONE HYDROCHLORIDE AND ACETAMINOPHEN 1 TABLET: 5; 325 TABLET ORAL at 09:57

## 2020-12-04 RX ADMIN — CEFAZOLIN 1 G: 1 INJECTION, POWDER, FOR SOLUTION INTRAMUSCULAR; INTRAVENOUS; PARENTERAL at 07:30

## 2020-12-04 RX ADMIN — SODIUM CHLORIDE, POTASSIUM CHLORIDE, SODIUM LACTATE AND CALCIUM CHLORIDE: 600; 310; 30; 20 INJECTION, SOLUTION INTRAVENOUS at 08:18

## 2020-12-04 RX ADMIN — PROPOFOL 150 MG: 10 INJECTION, EMULSION INTRAVENOUS at 07:35

## 2020-12-04 RX ADMIN — LIDOCAINE HYDROCHLORIDE 30 MG: 20 INJECTION, SOLUTION INFILTRATION; PERINEURAL at 07:30

## 2020-12-04 RX ADMIN — SODIUM CHLORIDE, POTASSIUM CHLORIDE, SODIUM LACTATE AND CALCIUM CHLORIDE 125 ML/HR: 600; 310; 30; 20 INJECTION, SOLUTION INTRAVENOUS at 07:13

## 2020-12-04 NOTE — ANESTHESIA POSTPROCEDURE EVALUATION
Patient: Shyam Salas    Procedure Summary     Date: 12/04/20 Room / Location: [facility] OR  /  [facility] OR    Anesthesia Start: 0730 Anesthesia Stop: 0920    Procedures:       INGUINAL HERNIA REPAIR LAPAROSCOPIC WITH DAVINCI ROBOT (Bilateral Abdomen)      VENTRAL HERNIA REPAIR LAPAROSCOPIC WITH DAVINCI ROBOT (N/A Abdomen) Diagnosis:       Non-recurrent bilateral inguinal hernia without obstruction or gangrene      (Non-recurrent bilateral inguinal hernia without obstruction or gangrene [K40.20])    Surgeon: Kelvin Diana MD Provider: Hugo Lehman MD    Anesthesia Type: general with block ASA Status: 2          Anesthesia Type: general with block    Vitals  Vitals Value Taken Time   /75 12/04/20 0920   Temp 97.8 °F (36.6 °C) 12/04/20 0920   Pulse 70 12/04/20 0920   Resp 16 12/04/20 0920   SpO2 96 % 12/04/20 0920           Post Anesthesia Care and Evaluation    Patient location during evaluation: PHASE II  Patient participation: complete - patient participated  Level of consciousness: awake and alert  Pain score: 0  Pain management: adequate  Airway patency: patent  Anesthetic complications: No anesthetic complications    Cardiovascular status: acceptable  Respiratory status: acceptable  Hydration status: acceptable

## 2020-12-04 NOTE — H&P
Subjective      Shyam Salas is a 55 y.o. male is being seen for consultation today at the request of Claudine Bonilla MD     Shyam Salas is a 55 y.o. male Presenting with occasionally painful right bulge in the groin consistent with inguinal hernia on examination.  Possible weakness of the ring with inguinal hernia on the contralateral side.  The patient also has a small reducible umbilical hernia.  No previous abdominal surgery.  No obstructive symptoms reported.        Medical History        Past Medical History:   Diagnosis Date   • Hypertension                   Family History   Problem Relation Age of Onset   • Hypertension Mother     • Diabetes Father     • Hypertension Father           Social History   Social History      Socioeconomic History   • Marital status:        Spouse name: Not on file   • Number of children: Not on file   • Years of education: Not on file   • Highest education level: Not on file   Tobacco Use   • Smoking status: Never Smoker   • Smokeless tobacco: Never Used   Substance and Sexual Activity   • Alcohol use: Never       Frequency: Never       Comment: Rarely   • Drug use: No   • Sexual activity: Not Currently           Surgical History         Past Surgical History:   Procedure Laterality Date   • TONSILLECTOMY               Review of Systems   Constitutional: Negative for activity change, appetite change, chills and fever.   HENT: Negative for sore throat and trouble swallowing.    Eyes: Negative for visual disturbance.   Respiratory: Negative for cough and shortness of breath.    Cardiovascular: Negative for chest pain and palpitations.   Gastrointestinal: Positive for abdominal pain. Negative for abdominal distention, blood in stool, constipation, diarrhea, nausea and vomiting.   Endocrine: Negative for cold intolerance and heat intolerance.   Genitourinary: Negative for dysuria.   Musculoskeletal: Negative for joint swelling.   Skin: Negative for color change, rash and  "wound.   Allergic/Immunologic: Negative for immunocompromised state.   Neurological: Negative for dizziness, seizures, weakness and headaches.   Hematological: Negative for adenopathy. Does not bruise/bleed easily.   Psychiatric/Behavioral: Negative for agitation and confusion.            /86   Pulse 71   Ht 182.9 cm (72.01\")   Wt 89.8 kg (198 lb)   BMI 26.85 kg/m²      Objective      Physical Exam  Constitutional:       Appearance: He is well-developed.   HENT:      Head: Normocephalic and atraumatic.   Eyes:      Conjunctiva/sclera: Conjunctivae normal.      Pupils: Pupils are equal, round, and reactive to light.   Neck:      Musculoskeletal: Neck supple.      Thyroid: No thyromegaly.      Vascular: No JVD.      Trachea: No tracheal deviation.   Cardiovascular:      Rate and Rhythm: Normal rate and regular rhythm.      Heart sounds: No murmur. No friction rub. No gallop.    Pulmonary:      Effort: Pulmonary effort is normal.      Breath sounds: Normal breath sounds.   Abdominal:      General: There is no distension.      Palpations: Abdomen is soft. There is no hepatomegaly or splenomegaly.      Tenderness: There is no abdominal tenderness.      Hernia: A hernia is present. Hernia is present in the umbilical area and right inguinal area.      Comments: Weakness of the left inguinal ring and possible hernia   Musculoskeletal: Normal range of motion.         General: No deformity.   Skin:     General: Skin is warm and dry.   Neurological:      Mental Status: He is alert and oriented to person, place, and time.                      Assessment      Diagnoses and all orders for this visit:     1. Non-recurrent bilateral inguinal hernia without obstruction or gangrene (Primary)  -     Case Request; Standing  -     ceFAZolin (ANCEF) 1 g/100 mL 0.9% NS IVPB (mbp)  -     Case Request     Other orders  -     Follow anesthesia standing orders.  -     Provide NPO Instructions to Patient; Future  -     Chlorhexidine " Skin Prep; Future  -     Follow Anesthesia Guidelines / Protocol; Standing  -     Verify NPO Status; Standing  -     Obtain informed consent; Standing  -     SCD (sequential compression device)- to be placed on patient in Pre-op; Standing  -     Verify / Perform Chlorhexidine Skin Prep; Standing  -     Verify / Perform Chlorhexidine Skin Prep if Indicated (If Not Already Completed); Standing  -     Instructions on coughing, deep breathing, and incentive spirometry.; Standing        Shyam Salas is a 55 y.o. male with small reducible umbilical and symptomatic right inguinal hernia possible left inguinal hernia defect.  He understands risk and benefits of surgery will undergo robotic repair of umbilical and right possible bilateral inguinal hernia.     Patient's Body mass index is 26.85 kg/m². BMI is above normal parameters. Recommendations include: educational material.

## 2020-12-04 NOTE — OP NOTE
INGUINAL HERNIA REPAIR LAPAROSCOPIC WITH DAVINCI ROBOT, VENTRAL HERNIA REPAIR LAPAROSCOPIC WITH DAVINCI ROBOT  Procedure Note    Shyam Salas  12/4/2020    Pre-op Diagnosis:   Non-recurrent bilateral inguinal hernia without obstruction or gangrene [K40.20]  Reducible umbilical hernia    Post-op Diagnosis:   same    Indications: SEE ABOVE    Procedure(s):  BILATERAL INGUINAL HERNIA REPAIR LAPAROSCOPIC WITH DAVINCI ROBOT  OPEN UMBILICAL HERNIA REPAIR     Surgeon(s):  Kelvin Diana MD    Anesthesia: General    Staff:   Circulator: Johan Gutierrez RN; Lea Myles RN  Scrub Person: Linh Dickson  Assistant: Koko Arthur CSA    Findings: 1.5 cm reducible umbilical hernia, right direct and indirect inguinal hernia, left direct inguinal hernia.  Primary umbilical hernia repair performed, large 3D max mesh placed bilateral preperitoneal space for inguinal hernias.    Operative Procedure: The patient was taken operating suite and placed supine on operating table.  Bilateral sequential compression devices were applied and general endotracheal anesthesia administered.  A Ayala catheter was inserted.  The abdomen was prepped and draped in usual sterile fashion.  Preoperative antibiotics were confirmed.  Timeout procedure was performed.  A curvilinear infraumbilical incision was made and dissection was carried down to the anterior rectus sheath.  Circumferential control of the umbilical stalk was obtained and the hernia sac was sharply dissected from the umbilical dermis.  The hernia sac was opened and a 12 mm trocar placed into the peritoneal space.  Laparoscopic survey showed evidence of bilateral inguinal hernia.   In the anterior axillary line bilaterally at the level of the supraumbilical trocar 8 mm robotic trochars were placed.  At this time the robot was docked and the laparoscope was inserted.  After determining target anatomy the robot was targeted in the fenestrated bipolar grasper and scissors  were placed into the abdominal space under visualization.  With all instruments docked I then exited the sterile field and entered the robotic console.  Beginning on the right a peritoneal flap was created from the anterior superior iliac spine to the medial umbilical vein.  The peritoneal flap was then dissected free with blunt and sharp dissection inferiorly to the pubic tubercle.  The pubic tubercle was exposed across the midline.  Dissection was carried laterally along the myopectineal orifice exposing the entirety of the myopectineal orifice with no evidence of femoral hernia.  Laterally the space was created to allow adequate space for mesh placement.  At this time the cord structures were identified and dissected free circumferentially from the herniated peritoneum.  At all hernia defects the peritoneum was dissected free and removed from the hernia defects and reflected back off the cord structures to allow adequate space for mesh placement.  Inspection for cord lipomas were made and there were none.  Following completion of the dissection attention was turned to contralateral dissection.  In a similar fashion a peritoneal flap was made from the anterior superior iliac spine to the medial umbilical vein.  The flap was carried down inferiorly with blunt and sharp dissection to the pubic tubercle.  The contralateral space was entered crossing the midline.  Dissection was carried out laterally to allow adequate space for mesh placement.  The cord structures were identified with care to preserve the vas deferens and this was dissected free from the herniated peritoneal tissue.  At all hernia defects in the herniated peritoneum was dissected free from the defects with no evidence of a femoral hernia.  The herniated peritoneum was then reflected posteriorly to allow adequate space for mesh placement.  At the completion of the dissection the myopectineal orifice was completely visualized with no evidence of femoral  hernia and at this time bilateral mesh placement began.  A large 3D max mesh was placed first on the right hernia space and anchored to the Richmond's ligament medially with a Vicryl suture.  The mesh covered all hernia defects with no evidence of infolding with approximation of the peritoneal flap.  The peritoneal flap was then closed with a running 2-0V lock suture with no evidence of peritoneal tears or exposure of the mesh.  The left-sided 3D max mesh was placed in a similar fashion into the peritoneal pocket and secured to the Richmond's ligament with a Vicryl suture medially.  The mesh covered all hernia defects with no evidence of folding with approximation of the peritoneal flap.  The peritoneal flap was then approximated with a running 2-0V lock suture with no evidence of peritoneal tear or exposed mesh.  At this time all needles were removed by my assistant and I exited the robotic console and entered the operative field.  The robot was undocked and removed and at this time all trochars were removed under direct visualization and pneumoperitoneum was evacuated.  All sponge lap and needle counts were correct.  The umbilical hernia was then repaired with interrupted Nurolon sutures.  The umbilical dermis was then tacked to the fascia to create an inward facing umbilicus.  All skin incisions were then closed with Monocryl subcuticular suture and dressed with skin affix.  The patient's Ayala catheter was removed and the bilateral testes were reduced into the scrotum.  Patient tolerated the procedure well.    Estimated Blood Loss: 10 mL    Specimens: None                   Drains: None    Grafts or Implants: 3D max mesh large bilateral preperitoneal space    Complications: None      Kelvin Diana MD     Date: 12/4/2020  Time: 09:28 EST

## 2020-12-04 NOTE — ANESTHESIA PROCEDURE NOTES
Airway  Urgency: elective    Date/Time: 12/4/2020 7:35 AM  Airway not difficult    General Information and Staff    Patient location during procedure: OR  Anesthesiologist: Hugo Lehman MD  CRNA: Haley Rashid CRNA    Indications and Patient Condition  Indications for airway management: airway protection    Preoxygenated: yes  MILS maintained throughout  Mask difficulty assessment: 2 - vent by mask + OA or adjuvant +/- NMBA    Final Airway Details  Final airway type: endotracheal airway      Successful airway: ETT  Cuffed: yes   Successful intubation technique: direct laryngoscopy  Endotracheal tube insertion site: oral  Blade: Megan  Blade size: 3  ETT size (mm): 7.5  Cormack-Lehane Classification: grade IIa - partial view of glottis  Placement verified by: chest auscultation, capnometry and palpation of cuff   Cuff volume (mL): 6  Measured from: lips  ETT/EBT  to lips (cm): 22  Number of attempts at approach: 1  Assessment: lips, teeth, and gum same as pre-op and atraumatic intubation    Additional Comments  Dentition as preop. No complications noted. Patient tolerated well.  ETT secured.

## 2020-12-04 NOTE — ANESTHESIA PROCEDURE NOTES
"Peripheral Block      Patient location during procedure: OR  Start time: 12/4/2020 7:44 AM  Stop time: 12/4/2020 7:47 AM  Reason for block: at surgeon's request and post-op pain management  Performed by  Anesthesiologist: Hugo Lehman MD  CRNA: Haley Rashid CRNA  Preanesthetic Checklist  Completed: patient identified, site marked, surgical consent, pre-op evaluation, timeout performed, IV checked, risks and benefits discussed and monitors and equipment checked  Prep:  Pt Position: supine  Sterile barriers:cap, gloves, sterile barriers and mask  Prep: ChloraPrep  Patient monitoring: blood pressure monitoring, continuous pulse oximetry and EKG  Procedure  Sedation:yes (Sedation, GA, Spinal,Epidural )  Performed under: general  Guidance:ultrasound guided  ULTRASOUND INTERPRETATION. Using ultrasound guidance a 20 G (20g 4\" Stimuplex) gauge needle was placed in close proximity to the nerve, at which point, under ultrasound guidance anesthetic was injected in the area of the nerve and spread of the anesthesia was seen on ultrasound in close proximity thereto.  There were no abnormalities seen on ultrasound; a digital image was taken; and the patient tolerated the procedure with no complications. Images:still images obtained    Laterality:Bilateral  Block Type:TAP  Injection Technique:single-shot  Needle Type:short-bevel  Needle Gauge:20 G  Resistance on Injection: none    Medications Used: dexamethasone (DECADRON) injection, 8 mg  buprenorphine (BUPRENEX) injection, 0.3 mg  ropivacaine (NAROPIN) injection 0.5 %, 250 mg      Medications  Comment:Block Injection:  Total volume divided equally between Right and Left block      Post Assessment  Injection Assessment: negative aspiration for heme, incremental injection and no paresthesia on injection  Patient Tolerance:comfortable throughout block  Complications:no  Additional Notes  The pt was in the supine position under general anesthesia    Under Ultrasound " guidance, a La 4inch 360 degree needle was advanced with Normal Saline hydro dissection of tissue.  The Rectus and Transversus Abdominus muscles where visualized.  The needle tip was placed between the Transversus Abdominus and rectus abdominus, local anesthetic spread was visualized in the Transversus Abdominus Plane. Injection was made incrementally with aspiration every 5 mls.  There was no  intravascular injection,  injection pressure was normal, there was no neural injection, and the procedure was completed without difficulty. The same procedure was completed for left and right sided subcostal tap blocks. Thank You.

## 2020-12-04 NOTE — ANESTHESIA POSTPROCEDURE EVALUATION
Patient: Shyam Salas    Procedure Summary     Date: 12/04/20 Room / Location: [facility] OR  /  [facility] OR    Anesthesia Start: 0730 Anesthesia Stop: 0920    Procedures:       INGUINAL HERNIA REPAIR LAPAROSCOPIC WITH DAVINCI ROBOT (Bilateral Abdomen)      VENTRAL HERNIA REPAIR LAPAROSCOPIC WITH DAVINCI ROBOT (N/A Abdomen) Diagnosis:       Non-recurrent bilateral inguinal hernia without obstruction or gangrene      (Non-recurrent bilateral inguinal hernia without obstruction or gangrene [K40.20])    Surgeon: Kelvin Diana MD Provider: Hugo Lehman MD    Anesthesia Type: general with block ASA Status: 2          Anesthesia Type: general with block    Vitals  Vitals Value Taken Time   /83 12/04/20 0950   Temp 97.8 °F (36.6 °C) 12/04/20 0920   Pulse 66 12/04/20 0950   Resp 17 12/04/20 0950   SpO2 95 % 12/04/20 0950           Post Anesthesia Care and Evaluation    Patient location during evaluation: bedside  Patient participation: complete - patient participated  Level of consciousness: awake and alert  Pain score: 1  Pain management: adequate  Airway patency: patent  Anesthetic complications: No anesthetic complications  PONV Status: none  Cardiovascular status: acceptable  Respiratory status: acceptable  Hydration status: acceptable

## 2020-12-04 NOTE — ANESTHESIA PREPROCEDURE EVALUATION
Anesthesia Evaluation     no history of anesthetic complications:  NPO Solid Status: > 8 hours  NPO Liquid Status: > 8 hours           Airway   Mallampati: II  TM distance: >3 FB  Neck ROM: full  No difficulty expected  Dental    (+) poor dentition        Pulmonary - normal exam   Cardiovascular - normal exam    (+) hypertension, hyperlipidemia,       Neuro/Psych  GI/Hepatic/Renal/Endo      Musculoskeletal     Abdominal  - normal exam   Substance History      OB/GYN          Other                      Anesthesia Plan    ASA 2     general with block     intravenous induction     Anesthetic plan, all risks, benefits, and alternatives have been provided, discussed and informed consent has been obtained with: patient.

## 2020-12-07 ENCOUNTER — OFFICE VISIT (OUTPATIENT)
Dept: UROLOGY | Facility: CLINIC | Age: 55
End: 2020-12-07

## 2020-12-07 VITALS — HEIGHT: 72 IN | BODY MASS INDEX: 26.8 KG/M2

## 2020-12-07 DIAGNOSIS — N40.1 BENIGN PROSTATIC HYPERPLASIA WITH WEAK URINARY STREAM: ICD-10-CM

## 2020-12-07 DIAGNOSIS — R33.8 ACUTE URINARY RETENTION: Primary | ICD-10-CM

## 2020-12-07 DIAGNOSIS — R30.0 DYSURIA: ICD-10-CM

## 2020-12-07 DIAGNOSIS — R39.12 BENIGN PROSTATIC HYPERPLASIA WITH WEAK URINARY STREAM: ICD-10-CM

## 2020-12-07 DIAGNOSIS — N40.1 BPH WITH OBSTRUCTION/LOWER URINARY TRACT SYMPTOMS: ICD-10-CM

## 2020-12-07 DIAGNOSIS — N13.8 BPH WITH OBSTRUCTION/LOWER URINARY TRACT SYMPTOMS: ICD-10-CM

## 2020-12-07 PROCEDURE — 99214 OFFICE O/P EST MOD 30 MIN: CPT | Performed by: NURSE PRACTITIONER

## 2020-12-07 RX ORDER — PHENAZOPYRIDINE HYDROCHLORIDE 200 MG/1
200 TABLET, FILM COATED ORAL 3 TIMES DAILY PRN
Qty: 20 TABLET | Refills: 0 | Status: SHIPPED | OUTPATIENT
Start: 2020-12-07

## 2020-12-07 RX ORDER — TAMSULOSIN HYDROCHLORIDE 0.4 MG/1
1 CAPSULE ORAL DAILY
Qty: 30 CAPSULE | Refills: 5 | Status: SHIPPED | OUTPATIENT
Start: 2020-12-07 | End: 2020-12-22 | Stop reason: SDUPTHER

## 2020-12-07 NOTE — PATIENT INSTRUCTIONS
Laparoscopic Inguinal Hernia Repair, Adult, Care After  This sheet gives you information about how to care for yourself after your procedure. Your health care provider may also give you more specific instructions. If you have problems or questions, contact your health care provider.  What can I expect after the procedure?  After the procedure, it is common to have:  · Pain.  · Swelling and bruising around the incision area.  · Scrotal swelling, in men.  · Some fluid or blood draining from your incisions.  Follow these instructions at home:  Incision care  · Follow instructions from your health care provider about how to take care of your incisions. Make sure you:  ? Wash your hands with soap and water before you change your bandage (dressing). If soap and water are not available, use hand .  ? Change your dressing as told by your health care provider.  ? Leave stitches (sutures), skin glue, or adhesive strips in place. These skin closures may need to stay in place for 2 weeks or longer. If adhesive strip edges start to loosen and curl up, you may trim the loose edges. Do not remove adhesive strips completely unless your health care provider tells you to do that.  · Check your incision area every day for signs of infection. Check for:  ? More redness, swelling, or pain.  ? More fluid or blood.  ? Warmth.  ? Pus or a bad smell.  · Wear loose, soft clothing while your incisions heal.  Driving  · Do not drive or use heavy machinery while taking prescription pain medicine.  · Do not drive for 24 hours if you were given a medicine to help you relax (sedative) during your procedure.  Activity  · Do not lift anything that is heavier than 10 lb (4.5 kg), or the limit that you are told, until your health care provider says that it is safe.  · Ask your health care provider what activities are safe for you. A lot of activity during the first week after surgery can increase pain and swelling. For 1 week after your  procedure:  ? Avoid activities that take a lot of effort, such as exercise or sports.  ? You may walk and climb stairs as needed for daily activity, but avoid long walks or climbing stairs for exercise.  Managing pain and swelling    · Put ice on painful or swollen areas:  ? Put ice in a plastic bag.  ? Place a towel between your skin and the bag.  ? Leave the ice on for 20 minutes, 2-3 times a day.  General instructions  · Do not take baths, swim, or use a hot tub until your health care provider approves. Ask your health care provider if you may take showers. You may only be allowed to take sponge baths.  · Take over-the-counter and prescription medicines only as told by your health care provider.  · To prevent or treat constipation while you are taking prescription pain medicine, your health care provider may recommend that you:  ? Drink enough fluid to keep your urine pale yellow.  ? Take over-the-counter or prescription medicines.  ? Eat foods that are high in fiber, such as fresh fruits and vegetables, whole grains, and beans.  ? Limit foods that are high in fat and processed sugars, such as fried and sweet foods.  · Do not use any products that contain nicotine or tobacco, such as cigarettes and e-cigarettes. If you need help quitting, ask your health care provider.  · Drink enough fluid to keep your urine pale yellow.  · Keep all follow-up visits as told by your health care provider. This is important.  Contact a health care provider if:  · You have more redness, swelling, or pain around your incisions or your groin area.  · You have more swelling in your scrotum.  · You have more fluid or blood coming from your incisions.  · Your incisions feel warm to the touch.  · You have severe pain and medicines do not help.  · You have abdominal pain or swelling.  · You cannot eat or drink without vomiting.  · You cannot urinate or pass a bowel movement.  · You faint.  · You feel dizzy.  · You have nausea and  vomiting.  · You have a fever.  Get help right away if:  · You have pus or a bad smell coming from your incisions.  · You have redness, warmth, or pain in your leg.  · You have chest pain.  · You have problems breathing.  Summary  · Pain, swelling, and bruising are common after the procedure.  · Check your incision area every day for signs of infection, such as more redness, swelling, or pain.  · Put ice on painful or swollen areas for 20 minutes, 2-3 times a day.  This information is not intended to replace advice given to you by your health care provider. Make sure you discuss any questions you have with your health care provider.  Document Revised: 05/27/2020 Document Reviewed: 03/29/2018  [employer] Patient Education © 2020 [employer]  Benign Prostatic Hyperplasia    Benign prostatic hyperplasia (BPH) is an enlarged prostate gland that is caused by the normal aging process and not by cancer. The prostate is a walnut-sized gland that is involved in the production of semen. It is located in front of the rectum and below the bladder. The bladder stores urine and the urethra is the tube that carries the urine out of the body. The prostate may get bigger as a man gets older.  An enlarged prostate can press on the urethra. This can make it harder to pass urine. The build-up of urine in the bladder can cause infection. Back pressure and infection may progress to bladder damage and kidney (renal) failure.  What are the causes?  This condition is part of a normal aging process. However, not all men develop problems from this condition. If the prostate enlarges away from the urethra, urine flow will not be blocked. If it enlarges toward the urethra and compresses it, there will be problems passing urine.  What increases the risk?  This condition is more likely to develop in men over the age of 50 years.  What are the signs or symptoms?  Symptoms of this condition include:  · Getting up often during the night to  urinate.  · Needing to urinate frequently during the day.  · Difficulty starting urine flow.  · Decrease in size and strength of your urine stream.  · Leaking (dribbling) after urinating.  · Inability to pass urine. This needs immediate treatment.  · Inability to completely empty your bladder.  · Pain when you pass urine. This is more common if there is also an infection.  · Urinary tract infection (UTI).  How is this diagnosed?  This condition is diagnosed based on your medical history, a physical exam, and your symptoms. Tests will also be done, such as:  · A post-void bladder scan. This measures any amount of urine that may remain in your bladder after you finish urinating.  · A digital rectal exam. In a rectal exam, your health care provider checks your prostate by putting a lubricated, gloved finger into your rectum to feel the back of your prostate gland. This exam detects the size of your gland and any abnormal lumps or growths.  · An exam of your urine (urinalysis).  · A prostate specific antigen (PSA) screening. This is a blood test used to screen for prostate cancer.  · An ultrasound. This test uses sound waves to electronically produce a picture of your prostate gland.  Your health care provider may refer you to a specialist in kidney and prostate diseases (urologist).  How is this treated?  Once symptoms begin, your health care provider will monitor your condition (active surveillance or watchful waiting). Treatment for this condition will depend on the severity of your condition. Treatment may include:  · Observation and yearly exams. This may be the only treatment needed if your condition and symptoms are mild.  · Medicines to relieve your symptoms, including:  ? Medicines to shrink the prostate.  ? Medicines to relax the muscle of the prostate.  · Surgery in severe cases. Surgery may include:  ? Prostatectomy. In this procedure, the prostate tissue is removed completely through an open incision or  with a laparoscope or robotics.  ? Transurethral resection of the prostate (TURP). In this procedure, a tool is inserted through the opening at the tip of the penis (urethra). It is used to cut away tissue of the inner core of the prostate. The pieces are removed through the same opening of the penis. This removes the blockage.  ? Transurethral incision (TUIP). In this procedure, small cuts are made in the prostate. This lessens the prostate's pressure on the urethra.  ? Transurethral microwave thermotherapy (TUMT). This procedure uses microwaves to create heat. The heat destroys and removes a small amount of prostate tissue.  ? Transurethral needle ablation (TUNA). This procedure uses radio frequencies to destroy and remove a small amount of prostate tissue.  ? Interstitial laser coagulation (ILC). This procedure uses a laser to destroy and remove a small amount of prostate tissue.  ? Transurethral electrovaporization (TUVP). This procedure uses electrodes to destroy and remove a small amount of prostate tissue.  ? Prostatic urethral lift. This procedure inserts an implant to push the lobes of the prostate away from the urethra.  Follow these instructions at home:  · Take over-the-counter and prescription medicines only as told by your health care provider.  · Monitor your symptoms for any changes. Contact your health care provider with any changes.  · Avoid drinking large amounts of liquid before going to bed or out in public.  · Avoid or reduce how much caffeine or alcohol you drink.  · Give yourself time when you urinate.  · Keep all follow-up visits as told by your health care provider. This is important.  Contact a health care provider if:  · You have unexplained back pain.  · Your symptoms do not get better with treatment.  · You develop side effects from the medicine you are taking.  · Your urine becomes very dark or has a bad smell.  · Your lower abdomen becomes distended and you have trouble passing your  urine.  Get help right away if:  · You have a fever or chills.  · You suddenly cannot urinate.  · You feel lightheaded, or very dizzy, or you faint.  · There are large amounts of blood or clots in the urine.  · Your urinary problems become hard to manage.  · You develop moderate to severe low back or flank pain. The flank is the side of your body between the ribs and the hip.  These symptoms may represent a serious problem that is an emergency. Do not wait to see if the symptoms will go away. Get medical help right away. Call your local emergency services (911 in the [place] Do not drive yourself to the hospital.  Summary  · Benign prostatic hyperplasia (BPH) is an enlarged prostate that is caused by the normal aging process and not by cancer.  · An enlarged prostate can press on the urethra. This can make it hard to pass urine.  · This condition is part of a normal aging process and is more likely to develop in men over the age of 50 years.  · Get help right away if you suddenly cannot urinate.  This information is not intended to replace advice given to you by your health care provider. Make sure you discuss any questions you have with your health care provider.  Document Revised: 11/12/2019 Document Reviewed: 01/22/2018  [email or web address] Patient Education © 2020 [email or web address]  Acute Urinary Retention, Male    Acute urinary retention means that you cannot pee (urinate) at all, or that you pee too little and your bladder is not emptied completely. If it is not treated, it can lead to kidney damage or other serious problems.  Follow these instructions at home:  · Take over-the-counter and prescription medicines only as told by your doctor. Ask your doctor what medicines you should stay away from. Do not take any medicine unless your doctor says it is okay to do so.  · If you were sent home with a tube that drains the bladder (catheter), take care of it as told by your doctor.  · Drink enough fluid to keep your pee clear or  pale yellow.  · If you were given an antibiotic, take it as told by your doctor. Do not stop taking the antibiotic even if you start to feel better.  · Do not use any products that contain nicotine or tobacco, such as cigarettes and e-cigarettes. If you need help quitting, ask your doctor.  · Watch for changes in your symptoms. Tell your doctor about them.  · If told, track changes in your blood pressure at home. Tell your doctor about them.  · Keep all follow-up visits as told by your doctor. This is important.  Contact a doctor if:  · You have spasms or you leak pee when you have spasms.  Get help right away if:  · You have chills or a fever.  · You have a tube that drains the bladder and:  ? The tube stops draining pee.  ? The tube falls out.  · You have blood in your pee.  Summary  · Acute urinary retention means that you have problems peeing. It may mean that you cannot pee at all, or that you pee too little.  · If this condition is not treated, it can lead to kidney damage or other serious problems.  · If you were sent home with a tube that drains the bladder, take care of it as told by your doctor.  · Monitor any changes in your symptoms. Tell your doctor about any changes.  This information is not intended to replace advice given to you by your health care provider. Make sure you discuss any questions you have with your health care provider.  Document Revised: 03/05/2020 Document Reviewed: 01/19/2018  [employer] Patient Education © 2020 [employer]

## 2020-12-07 NOTE — PROGRESS NOTES
Chief Complaint:          Chief Complaint   Patient presents with   • Urinary Retention     Surgery fu after Hernia /Cath Removal       HPI:   55 y.o. male.  Very pleasant, but very anxious male with a history of BPH and weak urinary stream, incomplete bladder emptying, on Cialis 5 mg daily evaluated with wife in clinic this morning.  Patient reports minimal improvement in symptoms since being on Cialis. Prior to that he states he was on Flomax.     Patient is seen for urinary retention today, post laparoscopic bilateral inguinal/ventral hernia repair on Friday, 12/4/2020 by Dr. Diana.  He has a Ayala catheter in and would like it out today.    We discussed a voiding trial on 12/08/20, with patient taking catheter out at 9 PM tonight, and being evaluated in clinic tomorrow 12/08 at 9 AM, but the patient is adamant and wants his catheter out today.      We discussed the risk of urinary retention again, sample catheters have been given with patient/wife educated on intermittent self-catheterization. will Start Flomax/Pyridium as needed today.      Past Medical History:        Past Medical History:   Diagnosis Date   • Diverticulitis    • Elevated cholesterol    • Hernia of fascia    • Hypertension        The following portions of the patient's history were reviewed and updated as appropriate: allergies, current medications, past family history, past medical history, past social history, past surgical history and problem list.    Current Meds:     Current Outpatient Medications   Medication Sig Dispense Refill   • acetaminophen (TYLENOL) 325 MG tablet Take 2 tablets by mouth Every 4 (Four) Hours As Needed for Mild Pain . 30 tablet 0   • ibuprofen (ADVIL,MOTRIN) 600 MG tablet Take 1 tablet by mouth Every 6 (Six) Hours As Needed for Mild Pain . 30 tablet 0   • NIFEdipine XL (PROCARDIA XL) 30 MG 24 hr tablet Take 30 mg by mouth 2 (two) times a day.     • pravastatin (Pravachol) 20 MG tablet Take 20 mg by mouth Daily.        • tadalafil (Cialis) 5 MG tablet Take 1 tablet by mouth Daily As Needed for Erectile Dysfunction. Take one Daily (Patient taking differently: Take 2.5 mg by mouth Every Night. Take one Daily) 30 tablet 6   • traMADol (ULTRAM) 50 MG tablet Take 1 tablet by mouth Every 6 (Six) Hours As Needed for Moderate Pain . 8 tablet 0   • phenazopyridine (Pyridium) 200 MG tablet Take 1 tablet by mouth 3 (Three) Times a Day As Needed for Bladder Spasms. 20 tablet 0   • tamsulosin (FLOMAX) 0.4 MG capsule 24 hr capsule Take 1 capsule by mouth Daily. 30 capsule 5     No current facility-administered medications for this visit.         Allergies:      Allergies   Allergen Reactions   • Benicar [Olmesartan] GI Intolerance        Past Surgical History:     Past Surgical History:   Procedure Laterality Date   • ADENOIDECTOMY     • COLONOSCOPY     • TONSILLECTOMY           Social History:     Social History     Socioeconomic History   • Marital status:      Spouse name: Not on file   • Number of children: Not on file   • Years of education: Not on file   • Highest education level: Not on file   Tobacco Use   • Smoking status: Never Smoker   • Smokeless tobacco: Never Used   Substance and Sexual Activity   • Alcohol use: Never     Frequency: Never     Comment: Rarely   • Drug use: No   • Sexual activity: Not Currently       Family History:     Family History   Problem Relation Age of Onset   • Hypertension Mother    • Diabetes Father    • Hypertension Father        Review of Systems:     Review of Systems   Constitutional: Positive for activity change and fatigue. Negative for appetite change, chills and fever.   HENT: Negative for congestion and sinus pressure.    Eyes: Negative for blurred vision and double vision.   Respiratory: Negative for shortness of breath and wheezing.    Cardiovascular: Negative for chest pain.   Gastrointestinal: Positive for abdominal pain. Negative for constipation, diarrhea, nausea and vomiting.    Genitourinary: Positive for difficulty urinating and dysuria. Negative for discharge, flank pain, frequency, genital sores, hematuria, penile pain, penile swelling, scrotal swelling, testicular pain, urgency and urinary incontinence.   Musculoskeletal: Negative for back pain and neck pain.   Neurological: Negative for dizziness, weakness, headache and confusion.   Hematological: Does not bruise/bleed easily.   Psychiatric/Behavioral: Positive for stress. Negative for agitation, behavioral problems and decreased concentration. The patient is nervous/anxious.         Physical Exam:     Physical Exam  Constitutional:       General: He is not in acute distress.     Appearance: He is well-developed. He is ill-appearing.   HENT:      Head: Normocephalic and atraumatic.      Right Ear: External ear normal.      Left Ear: External ear normal.   Eyes:      General:         Right eye: No discharge.         Left eye: No discharge.      Conjunctiva/sclera: Conjunctivae normal.      Pupils: Pupils are equal, round, and reactive to light.   Neck:      Musculoskeletal: Normal range of motion and neck supple.      Thyroid: No thyromegaly.      Trachea: No tracheal deviation.   Cardiovascular:      Rate and Rhythm: Normal rate and regular rhythm.      Heart sounds: No murmur. No friction rub.   Pulmonary:      Effort: Pulmonary effort is normal. No respiratory distress.      Breath sounds: Normal breath sounds. No stridor.   Abdominal:      General: Bowel sounds are normal. There is no distension.      Palpations: Abdomen is soft.      Tenderness: There is abdominal tenderness. There is no guarding.   Genitourinary:     Penis: Normal and uncircumcised. No tenderness or discharge.       Scrotum/Testes: Normal.      Rectum: Normal. Guaiac result negative.   Musculoskeletal: Normal range of motion.         General: Tenderness present. No deformity.   Skin:     General: Skin is warm and dry.      Capillary Refill: Capillary refill  takes less than 2 seconds.      Coloration: Skin is not pale.   Neurological:      Mental Status: He is alert and oriented to person, place, and time.      Cranial Nerves: No cranial nerve deficit.      Coordination: Coordination normal.   Psychiatric:         Behavior: Behavior normal.         Thought Content: Thought content normal.         Judgment: Judgment normal.         Procedure:     Ayala Catheter removal: I Prepped and Draped the patient in a sterile fashion,  Removed the existing  16 French straight tip catheter after deflating the 10 cc filled balloon.  Patient tolerated procedure well, encourage p.o. fluid intake    Assessment/Plan:         ASSESSMENT  ACUTE Urine Retention Post Hernia Repair/Cath Removal: Very pleasant but anxious male evaluated in clinic today for urinary retention post bilateral inguinal hernia repair on Friday 12/04 by Dr. Diana.    He reports doing relatively well post surgery, but would like his Ayala catheter out today as it is very uncomfortable.  Discussed doing a voiding trial and seeing patient back in clinic tomorrow, but patient is very adamant.  He tolerated his Ayala cath removal with minimal discomfort.        PLAN  We discussed the risks of urinary retention again, sample catheters given to patient/wife for intermittent catheterization if need be.  Return to clinic for assistance.    Start Flomax 0.4 mg today.    Pyridium 200 mg as needed dysuria.    Follow-up in clinic tomorrow if develops urinary retention and unable to self cath.    Discussed importance of bowel regimen/preventing constipation post surgery, due to risk of urine retention.    Otherwise follow-up with Dr. Caballero as scheduled.    Patient  and wife agreeable  With plan of care.    Patient reports that he is not currently experiencing any symptoms of urinary incontinence.    Patient's Body mass index is 26.8 kg/m². BMI is within normal parameters. No follow-up required..    Smoking Cessation  Counseling:  Never a smoker.  Patient does not currently use any tobacco products.     Counseling was given to patient and family for the following topics diagnostic results including: Urinary retention post hernia repair, dysuria, instructions for management as follows: Flomax 0.4 mg daily, Pyridium 200 mg as needed and risk factor reductions including: Bladder irritants such as caffeine products, coffee, tea, soda drinks, citrusy/spicy foods.. The interim medical history and current results were reviewed.  A treatment plan with follow-up was made for Acute urinary retention [R33.8].            This document has been electronically signed by Griselda Cheng-Akwa, APRN December 7, 2020 10:01 EST

## 2020-12-08 ENCOUNTER — OFFICE VISIT (OUTPATIENT)
Dept: UROLOGY | Facility: CLINIC | Age: 55
End: 2020-12-08

## 2020-12-08 VITALS — BODY MASS INDEX: 26.55 KG/M2 | TEMPERATURE: 97.9 F | WEIGHT: 196 LBS | HEIGHT: 72 IN

## 2020-12-08 DIAGNOSIS — K59.00 CONSTIPATION, UNSPECIFIED CONSTIPATION TYPE: ICD-10-CM

## 2020-12-08 DIAGNOSIS — R33.9 RETENTION OF URINE: Primary | ICD-10-CM

## 2020-12-08 PROCEDURE — 51798 US URINE CAPACITY MEASURE: CPT | Performed by: NURSE PRACTITIONER

## 2020-12-08 PROCEDURE — 99213 OFFICE O/P EST LOW 20 MIN: CPT | Performed by: NURSE PRACTITIONER

## 2020-12-08 NOTE — PROGRESS NOTES
Chief Complaint:          Chief Complaint   Patient presents with   • Acute Urinary Retention     Follow up -Urine Retention Post Hernia Repair       HPI:   55 y.o. male.  Very pleasant male evaluated in clinic with wife this morning.  He is in no apparent distress and reports significant improvement in his voiding function since starting Flomax. He denies dysuria, urine frequency, urgency or any burning on urination. His PVR is 0    Patient was seen in clinic few days ago after developing urinary retention post laparoscopic bilateral inguinal/ventral hernia repair by Dr. Diana on Friday, 12/4/2020.  He had a Ayala catheter out which we discontinued.    He has had issues with constipation, but reports doing better since starting laxative and stool softeners.  He has some suprapubic/pelvic bruising, which I explained to him is normal post his recent Hernia surgery.    Past Medical History:        Past Medical History:   Diagnosis Date   • Diverticulitis    • Elevated cholesterol    • Hernia of fascia    • Hypertension      The following portions of the patient's history were reviewed and updated as appropriate: allergies, current medications, past family history, past medical history, past social history, past surgical history and problem list.    Current Meds:     Current Outpatient Medications   Medication Sig Dispense Refill   • acetaminophen (TYLENOL) 325 MG tablet Take 2 tablets by mouth Every 4 (Four) Hours As Needed for Mild Pain . 30 tablet 0   • ibuprofen (ADVIL,MOTRIN) 600 MG tablet Take 1 tablet by mouth Every 6 (Six) Hours As Needed for Mild Pain . 30 tablet 0   • NIFEdipine XL (PROCARDIA XL) 30 MG 24 hr tablet Take 30 mg by mouth 2 (two) times a day.     • phenazopyridine (Pyridium) 200 MG tablet Take 1 tablet by mouth 3 (Three) Times a Day As Needed for Bladder Spasms. 20 tablet 0   • pravastatin (Pravachol) 20 MG tablet Take 20 mg by mouth Daily.     • tadalafil (Cialis) 5 MG tablet Take 1 tablet by  mouth Daily As Needed for Erectile Dysfunction. Take one Daily (Patient taking differently: Take 2.5 mg by mouth Every Night. Take one Daily) 30 tablet 6   • tamsulosin (FLOMAX) 0.4 MG capsule 24 hr capsule Take 1 capsule by mouth Daily. 30 capsule 5   • traMADol (ULTRAM) 50 MG tablet Take 1 tablet by mouth Every 6 (Six) Hours As Needed for Moderate Pain . 8 tablet 0     No current facility-administered medications for this visit.         Allergies:      Allergies   Allergen Reactions   • Benicar [Olmesartan] GI Intolerance        Past Surgical History:     Past Surgical History:   Procedure Laterality Date   • ADENOIDECTOMY     • COLONOSCOPY     • INGUINAL HERNIA REPAIR Bilateral 12/4/2020    Procedure: INGUINAL HERNIA REPAIR LAPAROSCOPIC WITH DAVINCI ROBOT;  Surgeon: Kelvin Diana MD;  Location: [facility];  Service: [facility];  Laterality: Bilateral;   • TONSILLECTOMY     • VENTRAL HERNIA REPAIR N/A 12/4/2020    Procedure: VENTRAL HERNIA REPAIR LAPAROSCOPIC WITH DAVINCI ROBOT;  Surgeon: Kelvin Diana MD;  Location: [facility];  Service: [facility];  Laterality: N/A;         Social History:     Social History     Socioeconomic History   • Marital status:      Spouse name: Not on file   • Number of children: Not on file   • Years of education: Not on file   • Highest education level: Not on file   Tobacco Use   • Smoking status: Never Smoker   • Smokeless tobacco: Never Used   Substance and Sexual Activity   • Alcohol use: Never     Frequency: Never     Comment: Rarely   • Drug use: No   • Sexual activity: Not Currently       Family History:     Family History   Problem Relation Age of Onset   • Hypertension Mother    • Diabetes Father    • Hypertension Father        Review of Systems:     Review of Systems   Constitutional: Positive for activity change and fatigue. Negative for appetite change, chills and fever.   HENT: Negative for congestion and sinus pressure.    Eyes: Negative for blurred  vision and double vision.   Respiratory: Negative for shortness of breath and wheezing.    Gastrointestinal: Positive for abdominal pain and constipation. Negative for diarrhea, nausea and vomiting.   Genitourinary: Positive for flank pain. Negative for difficulty urinating, discharge, dysuria, frequency, genital sores, hematuria, penile pain, penile swelling, scrotal swelling, testicular pain, urgency and urinary incontinence.   Musculoskeletal: Positive for back pain.   Neurological: Positive for weakness. Negative for dizziness and confusion.   Psychiatric/Behavioral: Positive for stress. Negative for agitation, behavioral problems and decreased concentration. The patient is nervous/anxious.         Physical Exam:     Physical Exam  Constitutional:       General: He is not in acute distress.     Appearance: He is well-developed. He is ill-appearing.   HENT:      Head: Normocephalic and atraumatic.      Right Ear: External ear normal.      Left Ear: External ear normal.   Eyes:      General:         Right eye: No discharge.         Left eye: No discharge.      Conjunctiva/sclera: Conjunctivae normal.      Pupils: Pupils are equal, round, and reactive to light.   Neck:      Musculoskeletal: Normal range of motion and neck supple.      Thyroid: No thyromegaly.      Trachea: No tracheal deviation.   Cardiovascular:      Rate and Rhythm: Normal rate and regular rhythm.      Heart sounds: No murmur. No friction rub.   Pulmonary:      Effort: Pulmonary effort is normal. No respiratory distress.      Breath sounds: Normal breath sounds. No stridor.   Abdominal:      General: Bowel sounds are normal. There is no distension.      Palpations: Abdomen is soft.      Tenderness: There is abdominal tenderness. There is guarding.   Genitourinary:     Penis: Normal and uncircumcised. No tenderness or discharge.       Scrotum/Testes: Normal.      Rectum: Normal. Guaiac result negative.   Musculoskeletal: Normal range of motion.          General: Tenderness present. No deformity.   Skin:     General: Skin is warm and dry.      Capillary Refill: Capillary refill takes less than 2 seconds.      Coloration: Skin is pale.   Neurological:      Mental Status: He is alert and oriented to person, place, and time.      Cranial Nerves: No cranial nerve deficit.      Coordination: Coordination normal.   Psychiatric:         Behavior: Behavior normal.         Thought Content: Thought content normal.         Judgment: Judgment normal.         Procedure:       Assessment/Plan:     Post Surgical Urine Retention - RESOLVED:Very pleasant but anxious male evaluated in clinic today for urinary retention post bilateral inguinal hernia repair on Friday 12/04 by Dr. Diana. He reports doing relatively well post catheter removal and reports voiding well on its own.  His PVR today is 0    He reports issues with constipation, and bruising in his pelvic/suprapubic region.  He is currently taking OTC stool softeners with minimal improvement.  Encourage p.o. fluid intake, increase fiber in diet.  Explained to patient postsurgical bruising was normal and will fade away in a few weeks.                                                     PLAN  We discussed the risks of urinary retention again, sample catheters given to patient/wife for intermittent catheterization if need be.  Return to clinic for assistance if needed.    Continue Flomax 0.4 mg today.  By Dr. Caballero may increase to twice daily    Continue Cialis 5 mg daily     Pyridium 200 mg as needed dysuria.    Discussed importance of maintaining bowel regiment/preventing constipation post surgery, due to risk of urine retention-sample Linzess given.     Otherwise follow-up with Dr. Caballero in 2 weeks as scheduled.     Patient  and wife agreeable  With plan of care.     Patient reports that he is not currently experiencing any symptoms of urinary incontinence.    Patient's Body mass index is 26.58 kg/m². BMI is within  normal parameters. No follow-up required..    Smoking Cessation Counseling:  Never a smoker.  Patient does not currently use any tobacco products.      Counseling was given to patient and family for the following topics diagnostic results including: Urinary retention post hernia repair, dysuria, instructions for management as follows: Flomax 0.4 mg daily,cialis 5 mg daily,  Pyridium 200 mg as needed and risk factor reductions including: Bladder irritants such as caffeine products, coffee, tea, soda drinks, citrusy/spicy foods. The interim medical history and current results were reviewed.  A treatment plan with follow-up was made for Constipation, BPH,  Acute urinary retention [R33.8].            This document has been electronically signed by Griselda Cheng-Akwa, APRN December 9, 2020 10:53 EST

## 2020-12-15 ENCOUNTER — OFFICE VISIT (OUTPATIENT)
Dept: SURGERY | Facility: CLINIC | Age: 55
End: 2020-12-15

## 2020-12-15 VITALS — WEIGHT: 196 LBS | HEIGHT: 72 IN | BODY MASS INDEX: 26.55 KG/M2

## 2020-12-15 DIAGNOSIS — K40.20 NON-RECURRENT BILATERAL INGUINAL HERNIA WITHOUT OBSTRUCTION OR GANGRENE: Primary | ICD-10-CM

## 2020-12-15 PROCEDURE — 99024 POSTOP FOLLOW-UP VISIT: CPT | Performed by: SURGERY

## 2020-12-15 NOTE — PROGRESS NOTES
Subjective   Shyam Salas is a 55 y.o. male  is here today for follow-up.         Shyam Salas is a 55 y.o. male here for follow up after robotic bilateral inguinal hernia repair with mesh.  Patient is doing well.  He had some postoperative constipation and required Ayala catheter for urinary retention which has been removed.  The symptoms have resolved.  His incisions are healing well.  No evidence of complication or recurrence.      Assessment     Diagnoses and all orders for this visit:    1. Non-recurrent bilateral inguinal hernia without obstruction or gangrene (Primary)      Shyam Salas is a 55 y.o. male doing well after robotic bilateral inguinal hernia repair with mesh.  The patient is doing well and will follow-up as needed.  He is aware of the continued 6-week lifting restriction from the date of surgery.

## 2020-12-22 ENCOUNTER — OFFICE VISIT (OUTPATIENT)
Dept: UROLOGY | Facility: CLINIC | Age: 55
End: 2020-12-22

## 2020-12-22 VITALS — WEIGHT: 201 LBS | BODY MASS INDEX: 27.22 KG/M2 | HEIGHT: 72 IN | TEMPERATURE: 98.1 F

## 2020-12-22 DIAGNOSIS — N13.8 BPH WITH OBSTRUCTION/LOWER URINARY TRACT SYMPTOMS: ICD-10-CM

## 2020-12-22 DIAGNOSIS — R33.9 INCOMPLETE BLADDER EMPTYING: Primary | ICD-10-CM

## 2020-12-22 DIAGNOSIS — N40.1 BPH WITH OBSTRUCTION/LOWER URINARY TRACT SYMPTOMS: ICD-10-CM

## 2020-12-22 PROCEDURE — 99214 OFFICE O/P EST MOD 30 MIN: CPT | Performed by: NURSE PRACTITIONER

## 2020-12-22 PROCEDURE — 51798 US URINE CAPACITY MEASURE: CPT | Performed by: NURSE PRACTITIONER

## 2020-12-22 RX ORDER — PRAVASTATIN SODIUM 40 MG
1 TABLET ORAL DAILY
COMMUNITY
Start: 2020-11-25

## 2020-12-22 RX ORDER — TAMSULOSIN HYDROCHLORIDE 0.4 MG/1
1 CAPSULE ORAL DAILY
Qty: 30 CAPSULE | Refills: 5 | Status: SHIPPED | OUTPATIENT
Start: 2020-12-22

## 2020-12-22 RX ORDER — NIFEDIPINE 30 MG/1
1 TABLET, FILM COATED, EXTENDED RELEASE ORAL 2 TIMES DAILY
COMMUNITY
Start: 2020-10-28 | End: 2020-12-22 | Stop reason: SDUPTHER

## 2020-12-22 NOTE — PROGRESS NOTES
Chief Complaint:          Chief Complaint   Patient presents with   • Urinary Retention     f/u       HPI:   55 y.o. male.  Returns to clinic with wife today for follow-up on his urinary retention symptoms.  Patient is very pleased with his outcome and reports total resolution of his symptoms.  He denies having any urinary symptoms whatsoever, his IPSS score is 4, his PVR is 0.    Patient was seen in clinic 2 weeks ago ago after developing urinary retention post laparoscopic bilateral inguinal/ventral hernia repair by Dr. Diana on Friday, 12/4/2020.  He had a Ayala catheter out which we discontinued.     He has had issues with constipation, but reports doing better since starting laxative and stool softeners.  He has some suprapubic/pelvic bruising, still fading, again which I explained to him is normal post his recent Hernia surgery.      Past Medical History:        Past Medical History:   Diagnosis Date   • Diverticulitis    • Elevated cholesterol    • Hernia of fascia    • Hypertension         The following portions of the patient's history were reviewed and updated as appropriate: allergies, current medications, past family history, past medical history, past social history, past surgical history and problem list.    Current Meds:     Current Outpatient Medications   Medication Sig Dispense Refill   • acetaminophen (TYLENOL) 325 MG tablet Take 2 tablets by mouth Every 4 (Four) Hours As Needed for Mild Pain . 30 tablet 0   • ibuprofen (ADVIL,MOTRIN) 600 MG tablet Take 1 tablet by mouth Every 6 (Six) Hours As Needed for Mild Pain . 30 tablet 0   • NIFEdipine XL (PROCARDIA XL) 30 MG 24 hr tablet Take 30 mg by mouth 2 (two) times a day.     • phenazopyridine (Pyridium) 200 MG tablet Take 1 tablet by mouth 3 (Three) Times a Day As Needed for Bladder Spasms. 20 tablet 0   • pravastatin (PRAVACHOL) 40 MG tablet Take 1 tablet by mouth Daily.     • tadalafil (Cialis) 5 MG tablet Take 1 tablet by mouth Daily As Needed  for Erectile Dysfunction. Take one Daily (Patient taking differently: Take 2.5 mg by mouth Every Night. Take one Daily) 30 tablet 6   • tamsulosin (FLOMAX) 0.4 MG capsule 24 hr capsule Take 1 capsule by mouth Daily. 30 capsule 5   • traMADol (ULTRAM) 50 MG tablet Take 1 tablet by mouth Every 6 (Six) Hours As Needed for Moderate Pain . 8 tablet 0     No current facility-administered medications for this visit.         Allergies:      Allergies   Allergen Reactions   • Benicar [Olmesartan] GI Intolerance        Past Surgical History:     Past Surgical History:   Procedure Laterality Date   • ADENOIDECTOMY     • COLONOSCOPY     • INGUINAL HERNIA REPAIR Bilateral 12/4/2020    Procedure: INGUINAL HERNIA REPAIR LAPAROSCOPIC WITH DAVINCI ROBOT;  Surgeon: Kelvin Diana MD;  Location: [facility];  Service: [facility];  Laterality: Bilateral;   • TONSILLECTOMY     • VENTRAL HERNIA REPAIR N/A 12/4/2020    Procedure: VENTRAL HERNIA REPAIR LAPAROSCOPIC WITH DAVINCI ROBOT;  Surgeon: Kelvin Diana MD;  Location: [facility];  [facility];  Laterality: N/A;         Social History:     Social History     Socioeconomic History   • Marital status:      Spouse name: Not on file   • Number of children: Not on file   • Years of education: Not on file   • Highest education level: Not on file   Tobacco Use   • Smoking status: Never Smoker   • Smokeless tobacco: Never Used   Substance and Sexual Activity   • Alcohol use: Never     Frequency: Never     Comment: Rarely   • Drug use: No   • Sexual activity: Not Currently       Family History:     Family History   Problem Relation Age of Onset   • Hypertension Mother    • Diabetes Father    • Hypertension Father        Review of Systems:     Review of Systems   Constitutional: Negative for activity change, appetite change, chills, fatigue and fever.   HENT: Negative for congestion and sinus pressure.    Eyes: Negative for blurred vision and double vision.   Respiratory:  Negative for shortness of breath and wheezing.    Gastrointestinal: Positive for abdominal pain. Negative for constipation, diarrhea, nausea and vomiting.   Genitourinary: Negative for difficulty urinating, discharge, dysuria, flank pain, frequency, genital sores, hematuria, penile pain, penile swelling, scrotal swelling, testicular pain, urgency and urinary incontinence.   Musculoskeletal: Positive for back pain.   Neurological: Negative for dizziness, weakness and confusion.   Psychiatric/Behavioral: Positive for stress. Negative for agitation, behavioral problems and decreased concentration. The patient is nervous/anxious.       IPSS Questionnaire (AUA-7):  Over the past month…    1)  How often have you had a sensation of not emptying your bladder completely after you finish urinating?  1 - Less than 1 time in 5   2)  How often have you had to urinate again less than two hours after you finished urinating? 1 - Less than 1 time in 5   3)  How often have you found you stopped and started again several times when you urinated?  0 - Not at all   4) How difficult have you found it to postpone urination?  1 - Less than 1 time in 5   5) How often have you had a weak urinary stream?  0 - Not at all   6) How often have you had to push or strain to begin urination?  0 - Not at all   7) How many times did you most typically get up to urinate from the time you went to bed until the time you got up in the morning?  1 - 1 time   Total score:  0-7 mildly symptomatic.........................................................4    8-19 moderately symptomatic    20-35 severely symptomatic         Physical Exam:     Physical Exam  Constitutional:       General: He is not in acute distress.     Appearance: He is well-developed.   HENT:      Head: Normocephalic and atraumatic.      Right Ear: External ear normal.      Left Ear: External ear normal.   Eyes:      General:         Right eye: No discharge.         Left eye: No discharge.       Conjunctiva/sclera: Conjunctivae normal.      Pupils: Pupils are equal, round, and reactive to light.   Neck:      Musculoskeletal: Normal range of motion and neck supple.      Thyroid: No thyromegaly.      Trachea: No tracheal deviation.   Cardiovascular:      Rate and Rhythm: Normal rate and regular rhythm.      Heart sounds: No murmur. No friction rub.   Pulmonary:      Effort: Pulmonary effort is normal. No respiratory distress.      Breath sounds: Normal breath sounds. No stridor.   Abdominal:      General: Bowel sounds are normal. There is no distension.      Palpations: Abdomen is soft.      Tenderness: There is abdominal tenderness. There is guarding.   Genitourinary:     Penis: Normal and uncircumcised. No tenderness or discharge.       Scrotum/Testes: Normal.      Rectum: Normal. Guaiac result negative.   Musculoskeletal: Normal range of motion.         General: No tenderness or deformity.   Skin:     General: Skin is warm and dry.      Capillary Refill: Capillary refill takes less than 2 seconds.      Coloration: Skin is not pale.   Neurological:      Mental Status: He is alert and oriented to person, place, and time.      Cranial Nerves: No cranial nerve deficit.      Coordination: Coordination normal.   Psychiatric:         Behavior: Behavior normal.         Thought Content: Thought content normal.         Judgment: Judgment normal.         Procedure:       Assessment/Plan:          ASSESSMENT  Post Surgical Urine Retention - RESOLVED:Very pleasant but anxious male evaluated in clinic today for urinary retention post bilateral inguinal hernia repair on Friday 12/04 by Dr. Diana. He reports doing relatively well post catheter removal and reports voiding well on its own.    He states he has not had any need for self-catheterization, he denies any urinary symptoms today.  He is unable to give us any urine sample, states he voided prior to this appointment, his PVR today is 0, his IPSS score is 4.     He  reports resolving issues with constipation, and bruising in his pelvic/suprapubic region.  He is currently taking OTC stool softeners with minimal improvement.  Encourage p.o. fluid intake, increase fiber in diet.  Explained to patient postsurgical bruising was normal and will fade away in a few weeks.                                                     PLAN  We discussed the risks of urinary retention again.  Patient reports he has not had any need for self-catheterization.  Educated patient to return to clinic for assistance if needed.     Resume Flomax 0.4 mg daily-BPH     Continue Cialis 5 mg daily-ED     Pyridium 200 mg as needed dysuria.     Discussed importance of maintaining bowel regiment/preventing constipation post surgery, due to risk of urine retention-sample Linzess given.     Otherwise follow-up with Dr. Caballero in 1 year, or sooner if need be.     Patient  and wife agreeable  With plan of care.     Patient reports that he is not currently experiencing any symptoms of urinary incontinence.    Patient's Body mass index is 27.25 kg/m². BMI is above normal parameters. Recommendations include: educational material, exercise counseling and nutrition counseling.    Smoking Cessation Counseling:  Never a smoker.  Patient does not currently use any tobacco products.     Counseling was given to patient and family for the following topics diagnostic results including: Urinary retention post hernia repair, dysuria, instructions for management as follows: Flomax 0.4 mg daily,cialis 5 mg daily,  Pyridium 200 mg as needed and risk factor reductions including: Bladder irritants such as caffeine products, coffee, tea, soda drinks, citrusy/spicy foods. The interim medical history and current results were reviewed.  A treatment plan with follow-up was made for Constipation, BPH,  Acute urinary retention, with  Incomplete bladder emptying [R33.9].         This document has been electronically signed by Griselda  José Miguel, APRN December 22, 2020 13:18 EST

## 2021-02-19 ENCOUNTER — IMMUNIZATION (OUTPATIENT)
Dept: VACCINE CLINIC | Facility: HOSPITAL | Age: 56
End: 2021-02-19

## 2021-02-19 PROCEDURE — 91300 HC SARSCOV02 VAC 30MCG/0.3ML IM: CPT | Performed by: INTERNAL MEDICINE

## 2021-02-19 PROCEDURE — 0001A: CPT | Performed by: INTERNAL MEDICINE

## 2021-03-12 ENCOUNTER — IMMUNIZATION (OUTPATIENT)
Dept: VACCINE CLINIC | Facility: HOSPITAL | Age: 56
End: 2021-03-12

## 2021-03-12 PROCEDURE — 91300 HC SARSCOV02 VAC 30MCG/0.3ML IM: CPT | Performed by: INTERNAL MEDICINE

## 2021-03-12 PROCEDURE — 0002A: CPT | Performed by: INTERNAL MEDICINE

## 2021-03-30 ENCOUNTER — OFFICE VISIT (OUTPATIENT)
Dept: UROLOGY | Facility: CLINIC | Age: 56
End: 2021-03-30

## 2021-03-30 VITALS — BODY MASS INDEX: 27.22 KG/M2 | HEIGHT: 72 IN | TEMPERATURE: 97.7 F | WEIGHT: 201 LBS

## 2021-03-30 DIAGNOSIS — N40.1 BENIGN PROSTATIC HYPERPLASIA WITH WEAK URINARY STREAM: ICD-10-CM

## 2021-03-30 DIAGNOSIS — R30.0 DYSURIA: Primary | ICD-10-CM

## 2021-03-30 DIAGNOSIS — K40.20 NON-RECURRENT BILATERAL INGUINAL HERNIA WITHOUT OBSTRUCTION OR GANGRENE: ICD-10-CM

## 2021-03-30 DIAGNOSIS — K40.90 RIGHT INGUINAL HERNIA: ICD-10-CM

## 2021-03-30 DIAGNOSIS — R39.12 BENIGN PROSTATIC HYPERPLASIA WITH WEAK URINARY STREAM: ICD-10-CM

## 2021-03-30 LAB
BILIRUB BLD-MCNC: NEGATIVE MG/DL
CLARITY, POC: CLEAR
COLOR UR: YELLOW
GLUCOSE UR STRIP-MCNC: NEGATIVE MG/DL
KETONES UR QL: NEGATIVE
LEUKOCYTE EST, POC: NEGATIVE
NITRITE UR-MCNC: NEGATIVE MG/ML
PH UR: 6 [PH] (ref 5–8)
PROT UR STRIP-MCNC: NEGATIVE MG/DL
RBC # UR STRIP: NEGATIVE /UL
SP GR UR: 1.02 (ref 1–1.03)
UROBILINOGEN UR QL: NORMAL

## 2021-03-30 PROCEDURE — 81003 URINALYSIS AUTO W/O SCOPE: CPT | Performed by: UROLOGY

## 2021-03-30 PROCEDURE — 99213 OFFICE O/P EST LOW 20 MIN: CPT | Performed by: UROLOGY

## 2021-03-30 RX ORDER — CLOTRIMAZOLE AND BETAMETHASONE DIPROPIONATE 10; .64 MG/G; MG/G
CREAM TOPICAL 2 TIMES DAILY
Qty: 45 G | Refills: 2 | Status: SHIPPED | OUTPATIENT
Start: 2021-03-30

## 2021-04-01 PROBLEM — R30.0 DYSURIA: Status: ACTIVE | Noted: 2021-04-01

## 2022-10-20 ENCOUNTER — TRANSCRIBE ORDERS (OUTPATIENT)
Dept: ADMINISTRATIVE | Facility: HOSPITAL | Age: 57
End: 2022-10-20

## 2022-10-20 DIAGNOSIS — H93.11 RIGHT-SIDED TINNITUS: Primary | ICD-10-CM

## 2023-03-14 ENCOUNTER — OFFICE VISIT (OUTPATIENT)
Dept: UROLOGY | Facility: CLINIC | Age: 58
End: 2023-03-14
Payer: COMMERCIAL

## 2023-03-14 VITALS — BODY MASS INDEX: 27.22 KG/M2 | HEIGHT: 72 IN | WEIGHT: 201 LBS

## 2023-03-14 DIAGNOSIS — R39.12 BENIGN PROSTATIC HYPERPLASIA WITH WEAK URINARY STREAM: ICD-10-CM

## 2023-03-14 DIAGNOSIS — R30.0 DYSURIA: Primary | ICD-10-CM

## 2023-03-14 DIAGNOSIS — N40.1 BENIGN PROSTATIC HYPERPLASIA WITH WEAK URINARY STREAM: ICD-10-CM

## 2023-03-14 PROCEDURE — 84153 ASSAY OF PSA TOTAL: CPT | Performed by: UROLOGY

## 2023-03-14 PROCEDURE — 99213 OFFICE O/P EST LOW 20 MIN: CPT | Performed by: UROLOGY

## 2023-03-14 RX ORDER — PROPRANOLOL HYDROCHLORIDE 40 MG/1
TABLET ORAL
COMMUNITY
Start: 2023-01-27

## 2023-03-14 RX ORDER — AMLODIPINE BESYLATE 10 MG/1
TABLET ORAL
COMMUNITY
Start: 2022-12-09

## 2023-03-14 NOTE — PROGRESS NOTES
Chief Complaint:    BPH    HPI:   58 y.o. male returns today doing well wife made the appointment he is not sure why he is here he has no nocturia normal exam does not want Cialis PSA is pending I will see him back on an as-needed basis    Past Medical History:     Past Medical History:   Diagnosis Date   • Diverticulitis    • Elevated cholesterol    • Hernia of fascia    • Hypertension        Current Meds:     Current Outpatient Medications   Medication Sig Dispense Refill   • acetaminophen (TYLENOL) 325 MG tablet Take 2 tablets by mouth Every 4 (Four) Hours As Needed for Mild Pain . 30 tablet 0   • clotrimazole-betamethasone (Lotrisone) 1-0.05 % cream Apply  topically to the appropriate area as directed 2 (Two) Times a Day. 45 g 2   • ibuprofen (ADVIL,MOTRIN) 600 MG tablet Take 1 tablet by mouth Every 6 (Six) Hours As Needed for Mild Pain . 30 tablet 0   • NIFEdipine XL (PROCARDIA XL) 30 MG 24 hr tablet Take 30 mg by mouth 2 (two) times a day.     • phenazopyridine (Pyridium) 200 MG tablet Take 1 tablet by mouth 3 (Three) Times a Day As Needed for Bladder Spasms. 20 tablet 0   • pravastatin (PRAVACHOL) 40 MG tablet Take 1 tablet by mouth Daily.     • tadalafil (Cialis) 5 MG tablet Take 1 tablet by mouth Daily As Needed for Erectile Dysfunction. Take one Daily (Patient taking differently: Take 2.5 mg by mouth Every Night. Take one Daily) 30 tablet 6   • tamsulosin (FLOMAX) 0.4 MG capsule 24 hr capsule Take 1 capsule by mouth Daily. 30 capsule 5   • traMADol (ULTRAM) 50 MG tablet Take 1 tablet by mouth Every 6 (Six) Hours As Needed for Moderate Pain . 8 tablet 0     No current facility-administered medications for this visit.        Allergies:      Allergies   Allergen Reactions   • Benicar [Olmesartan] GI Intolerance        Past Surgical History:     Past Surgical History:   Procedure Laterality Date   • ADENOIDECTOMY     • COLONOSCOPY     • INGUINAL HERNIA REPAIR Bilateral 12/4/2020    Procedure: INGUINAL HERNIA  REPAIR LAPAROSCOPIC WITH DAVINCI ROBOT;  Surgeon: Kelvin Diana MD;  Location:  [facility];  Service: [facility];  Laterality: Bilateral;   • TONSILLECTOMY     • VENTRAL HERNIA REPAIR N/A 12/4/2020    Procedure: VENTRAL HERNIA REPAIR LAPAROSCOPIC WITH DAVINCI ROBOT;  Surgeon: Kelvin Diana MD;  Location:  [facility];  Service: [facility];  Laterality: N/A;       Social History:     Social History     Socioeconomic History   • Marital status:    Tobacco Use   • Smoking status: Never   • Smokeless tobacco: Never   Vaping Use   • Vaping Use: Never used   Substance and Sexual Activity   • Alcohol use: Never     Comment: Rarely   • Drug use: No   • Sexual activity: Not Currently       Family History:     Family History   Problem Relation Age of Onset   • Hypertension Mother    • Diabetes Father    • Hypertension Father        Review of Systems:     Review of Systems   Constitutional: Negative.    HENT: Negative.    Eyes: Negative.    Respiratory: Negative.    Cardiovascular: Negative.    Gastrointestinal: Negative.    Endocrine: Negative.    Musculoskeletal: Negative.    Allergic/Immunologic: Negative.    Neurological: Negative.    Hematological: Negative.    Psychiatric/Behavioral: Negative.        Physical Exam:     Physical Exam  Vitals and nursing note reviewed.   Constitutional:       Appearance: He is well-developed.   HENT:      Head: Normocephalic and atraumatic.   Eyes:      Conjunctiva/sclera: Conjunctivae normal.      Pupils: Pupils are equal, round, and reactive to light.   Cardiovascular:      Rate and Rhythm: Normal rate and regular rhythm.      Heart sounds: Normal heart sounds.   Pulmonary:      Effort: Pulmonary effort is normal.      Breath sounds: Normal breath sounds.   Abdominal:      General: Bowel sounds are normal.      Palpations: Abdomen is soft.   Musculoskeletal:         General: Normal range of motion.      Cervical back: Normal range of motion.   Skin:     General: Skin is warm  and dry.   Neurological:      Mental Status: He is alert and oriented to person, place, and time.      Deep Tendon Reflexes: Reflexes are normal and symmetric.   Psychiatric:         Behavior: Behavior normal.         Thought Content: Thought content normal.         Judgment: Judgment normal.         I have reviewed the following portions of the patient's history: Allergies, current medications, past family history, past medical history, past social history, past surgical history, problem list, and ROS and confirm it is accurate.    Recent Image (CT and/or KUB):      CT Abdomen and Pelvis: No results found for this or any previous visit.       CT Stone Protocol: No results found for this or any previous visit.       KUB: No results found for this or any previous visit.       Labs (past 3 months):      No visits with results within 3 Month(s) from this visit.   Latest known visit with results is:   Office Visit on 03/30/2021   Component Date Value Ref Range Status   • Color 03/30/2021 Yellow  Yellow, Straw, Dark Yellow, Mahnaz Final   • Clarity, UA 03/30/2021 Clear  Clear Final   • Specific Gravity  03/30/2021 1.020  1.005 - 1.030 Final   • pH, Urine 03/30/2021 6.0  5.0 - 8.0 Final   • Leukocytes 03/30/2021 Negative  Negative Final   • Nitrite, UA 03/30/2021 Negative  Negative Final   • Protein, POC 03/30/2021 Negative  Negative mg/dL Final   • Glucose, UA 03/30/2021 Negative  Negative, 1000 mg/dL (3+) mg/dL Final   • Ketones, UA 03/30/2021 Negative  Negative Final   • Urobilinogen, UA 03/30/2021 Normal  Normal Final   • Bilirubin 03/30/2021 Negative  Negative Final   • Blood, UA 03/30/2021 Negative  Negative Final        Procedure:       Assessment/Plan:   BPH: Discussed the pathophysiology of BPH and obstruction.  We discussed the static and dynamic effects of BPH as well as using 5 alpha reductase inhibitors versus alpha blockade.  We discussed the indications for transurethral surgery as well and/ or other  therapeutic options available including all of the newer techniques.  He is asymptomatic  PSA testing-I am recommending a PSA blood test that stands for prostate specific antigen.  I discussed the pathophysiology of PSA testing indicating its use in the diagnosis and management of prostate cancer.  I discussed the normal range being 0 to 4, but more appropriately being much closer to 0 to 2 in a normal male.  I discussed the fact that after a certain age we don't recommend PSA testing especially in view of numerous comorbidities, that this will not be a useful test.  I discussed many of the things that can artificially raise PSA including a recent infection, urinary tract infection, and recent sexual intercourse, or even the type of movement such as manipulation of the prostate from riding a bicycle.  After all this is taken into account when the test is reviewed, the most important use of PSA is the velocity measurement.  In other words, the change of PSA with time is a very important factor in the use and that we look for greater than 20% rise over a year to help us make the prediction of prostate cancer.  I also discussed that the use with prostate cancer indicating that after a radical prostatectomy, the PSA should be 0 and any rise indicates an early biochemical recurrence.            This document has been electronically signed by ADA GARCIA MD March 14, 2023 14:23 EDT    Dictated Utilizing Dragon Dictation: Part of this note may be an electronic transcription/translation of spoken language to printed text using the Dragon Dictation System.

## 2023-03-15 LAB — PSA SERPL-MCNC: 0.68 NG/ML (ref 0–4)

## 2024-01-09 ENCOUNTER — TRANSCRIBE ORDERS (OUTPATIENT)
Dept: ADMINISTRATIVE | Facility: HOSPITAL | Age: 59
End: 2024-01-09
Payer: COMMERCIAL

## 2024-01-09 DIAGNOSIS — H93.13 TINNITUS OF BOTH EARS: Primary | ICD-10-CM

## 2024-01-09 DIAGNOSIS — R51.9 FACIAL PAIN: ICD-10-CM

## 2024-02-02 ENCOUNTER — TRANSCRIBE ORDERS (OUTPATIENT)
Dept: ADMINISTRATIVE | Facility: HOSPITAL | Age: 59
End: 2024-02-02
Payer: COMMERCIAL

## 2024-02-02 DIAGNOSIS — H93.13 TINNITUS OF BOTH EARS: Primary | ICD-10-CM

## 2024-02-22 ENCOUNTER — HOSPITAL ENCOUNTER (OUTPATIENT)
Facility: HOSPITAL | Age: 59
Discharge: HOME | End: 2024-02-22
Admitting: INTERNAL MEDICINE
Payer: COMMERCIAL

## 2024-02-22 DIAGNOSIS — H93.13 TINNITUS OF BOTH EARS: ICD-10-CM

## 2024-02-22 PROCEDURE — 93880 EXTRACRANIAL BILAT STUDY: CPT

## (undated) DEVICE — PENCL ES MEGADINE EZ/CLEAN BUTN W/HOLSTR 10FT

## (undated) DEVICE — ELECTRODE,FOAM,MONITORING,SLD GEL,5 PK: Brand: MEDLINE

## (undated) DEVICE — MARKR SKIN W/RULR AND LBL

## (undated) DEVICE — SUCTION CANISTER, 1500CC, RIGID: Brand: DEROYAL

## (undated) DEVICE — TIP COVER ACCESSORY

## (undated) DEVICE — FENESTRATED BIPOLAR FORCEPS: Brand: ENDOWRIST

## (undated) DEVICE — [HIGH FLOW INSUFFLATOR,  DO NOT USE IF PACKAGE IS DAMAGED,  KEEP DRY,  KEEP AWAY FROM SUNLIGHT,  PROTECT FROM HEAT AND RADIOACTIVE SOURCES.]: Brand: PNEUMOSURE

## (undated) DEVICE — STERILE COTTON BALLS LARGE 5/P: Brand: MEDLINE

## (undated) DEVICE — COVER,MAYO STAND,STERILE: Brand: MEDLINE

## (undated) DEVICE — VIOLET BRAIDED (POLYGLACTIN 910), SYNTHETIC ABSORBABLE SUTURE: Brand: COATED VICRYL

## (undated) DEVICE — SUT SILK 0 SH 30IN K834H

## (undated) DEVICE — SUT VIC 0/0 UR6 27IN DYED J603H

## (undated) DEVICE — PERMANENT CAUTERY HOOK: Brand: ENDOWRIST

## (undated) DEVICE — SUT MNCRYL PLS ANTIB UD 4/0 PS2 18IN

## (undated) DEVICE — UNDYED BRAIDED (POLYGLACTIN 910), SYNTHETIC ABSORBABLE SUTURE: Brand: COATED VICRYL

## (undated) DEVICE — DRSNG SURESITE WNDW 4X4.5

## (undated) DEVICE — SEAL

## (undated) DEVICE — ARM DRAPE

## (undated) DEVICE — INSUFFLATION NEEDLE TO ESTABLISH PNEUMOPERITONEUM.: Brand: INSUFFLATION NEEDLE

## (undated) DEVICE — HOLDER: Brand: DEROYAL

## (undated) DEVICE — REDUCER: Brand: ENDOWRIST

## (undated) DEVICE — CANNULA SEAL

## (undated) DEVICE — HDRST POSTIN FM CRDL TRACH SLOT 9X8X4IN

## (undated) DEVICE — LARGE NEEDLE DRIVER: Brand: ENDOWRIST

## (undated) DEVICE — PATIENT RETURN ELECTRODE, SINGLE-USE, CONTACT QUALITY MONITORING, ADULT, WITH 9FT CORD, FOR PATIENTS WEIGING OVER 33LBS. (15KG): Brand: MEGADYNE

## (undated) DEVICE — TRY SKINPREP PVP SCRB W PAINT

## (undated) DEVICE — GLV SURG PREMIERPRO MIC LTX PF SZ7.5 BRN

## (undated) DEVICE — MONOPOLAR CURVED SCISSORS: Brand: ENDOWRIST

## (undated) DEVICE — APPL CHLORAPREP HI/LITE 26ML ORNG

## (undated) DEVICE — BLADELESS OBTURATOR: Brand: WECK VISTA

## (undated) DEVICE — CVR DISP HUG U VAC STEEP TREND

## (undated) DEVICE — Device

## (undated) DEVICE — PK LAP GEN 70

## (undated) DEVICE — DRAPE,UTILTY,TAPE,15X26, 4EA/PK: Brand: MEDLINE

## (undated) DEVICE — SKIN AFFIX SURG ADHESIVE 72/CS 0.55ML: Brand: MEDLINE

## (undated) DEVICE — TOTAL TRAY, 16FR 10ML SIL FOLEY, URN: Brand: MEDLINE

## (undated) DEVICE — GLV SURG SENSICARE MICRO PF LF 7.5 STRL